# Patient Record
Sex: MALE | Race: WHITE | Employment: UNEMPLOYED | URBAN - METROPOLITAN AREA
[De-identification: names, ages, dates, MRNs, and addresses within clinical notes are randomized per-mention and may not be internally consistent; named-entity substitution may affect disease eponyms.]

---

## 2018-07-20 ENCOUNTER — HOSPITAL ENCOUNTER (OUTPATIENT)
Dept: LAB | Age: 23
Discharge: HOME | End: 2018-07-20
Payer: COMMERCIAL

## 2018-07-20 ENCOUNTER — OFFICE VISIT (OUTPATIENT)
Dept: SURGERY | Age: 23
End: 2018-07-20

## 2018-07-20 VITALS
TEMPERATURE: 98.2 F | RESPIRATION RATE: 20 BRPM | BODY MASS INDEX: 44.1 KG/M2 | HEIGHT: 71 IN | SYSTOLIC BLOOD PRESSURE: 140 MMHG | WEIGHT: 315 LBS | DIASTOLIC BLOOD PRESSURE: 70 MMHG | HEART RATE: 84 BPM

## 2018-07-20 DIAGNOSIS — M12.9 ARTHROPATHY: ICD-10-CM

## 2018-07-20 DIAGNOSIS — E66.01 MORBID OBESITY (HCC): Primary | ICD-10-CM

## 2018-07-20 DIAGNOSIS — E66.01 MORBID OBESITY (HCC): ICD-10-CM

## 2018-07-20 LAB
25(OH)D3 SERPL-MCNC: 16.4 NG/ML (ref 30–100)
ALBUMIN SERPL-MCNC: 3.9 G/DL (ref 3.4–5)
ANION GAP SERPL CALC-SCNC: 7 MMOL/L (ref 3–18)
ATRIAL RATE: 83 BPM
BASOPHILS # BLD: 0 K/UL (ref 0–0.1)
BASOPHILS NFR BLD: 0 % (ref 0–2)
BUN SERPL-MCNC: 17 MG/DL (ref 7–18)
BUN/CREAT SERPL: 20 (ref 12–20)
CALCIUM SERPL-MCNC: 8.8 MG/DL (ref 8.5–10.1)
CALCULATED P AXIS, ECG09: 50 DEGREES
CALCULATED R AXIS, ECG10: 47 DEGREES
CALCULATED T AXIS, ECG11: 49 DEGREES
CHLORIDE SERPL-SCNC: 104 MMOL/L (ref 100–108)
CO2 SERPL-SCNC: 30 MMOL/L (ref 21–32)
CREAT SERPL-MCNC: 0.83 MG/DL (ref 0.6–1.3)
DIAGNOSIS, 93000: NORMAL
DIFFERENTIAL METHOD BLD: ABNORMAL
EOSINOPHIL # BLD: 0.2 K/UL (ref 0–0.4)
EOSINOPHIL NFR BLD: 2 % (ref 0–5)
ERYTHROCYTE [DISTWIDTH] IN BLOOD BY AUTOMATED COUNT: 13.2 % (ref 11.6–14.5)
FOLATE SERPL-MCNC: 10.5 NG/ML (ref 3.1–17.5)
GLUCOSE SERPL-MCNC: 100 MG/DL (ref 74–99)
HCT VFR BLD AUTO: 43 % (ref 36–48)
HGB BLD-MCNC: 14.3 G/DL (ref 13–16)
IRON SERPL-MCNC: 48 UG/DL (ref 50–175)
LYMPHOCYTES # BLD: 3.6 K/UL (ref 0.9–3.6)
LYMPHOCYTES NFR BLD: 26 % (ref 21–52)
MCH RBC QN AUTO: 27.2 PG (ref 24–34)
MCHC RBC AUTO-ENTMCNC: 33.3 G/DL (ref 31–37)
MCV RBC AUTO: 81.9 FL (ref 74–97)
MONOCYTES # BLD: 1 K/UL (ref 0.05–1.2)
MONOCYTES NFR BLD: 7 % (ref 3–10)
NEUTS SEG # BLD: 8.8 K/UL (ref 1.8–8)
NEUTS SEG NFR BLD: 65 % (ref 40–73)
P-R INTERVAL, ECG05: 144 MS
PLATELET # BLD AUTO: 423 K/UL (ref 135–420)
PMV BLD AUTO: 9.7 FL (ref 9.2–11.8)
POTASSIUM SERPL-SCNC: 4.9 MMOL/L (ref 3.5–5.5)
Q-T INTERVAL, ECG07: 368 MS
QRS DURATION, ECG06: 100 MS
QTC CALCULATION (BEZET), ECG08: 432 MS
RBC # BLD AUTO: 5.25 M/UL (ref 4.7–5.5)
SODIUM SERPL-SCNC: 141 MMOL/L (ref 136–145)
VENTRICULAR RATE, ECG03: 83 BPM
VIT B12 SERPL-MCNC: 499 PG/ML (ref 211–911)
WBC # BLD AUTO: 13.6 K/UL (ref 4.6–13.2)

## 2018-07-20 PROCEDURE — 82040 ASSAY OF SERUM ALBUMIN: CPT | Performed by: SURGERY

## 2018-07-20 PROCEDURE — 84425 ASSAY OF VITAMIN B-1: CPT | Performed by: SURGERY

## 2018-07-20 PROCEDURE — 85025 COMPLETE CBC W/AUTO DIFF WBC: CPT | Performed by: SURGERY

## 2018-07-20 PROCEDURE — 80048 BASIC METABOLIC PNL TOTAL CA: CPT | Performed by: SURGERY

## 2018-07-20 PROCEDURE — 93005 ELECTROCARDIOGRAM TRACING: CPT

## 2018-07-20 PROCEDURE — 82306 VITAMIN D 25 HYDROXY: CPT | Performed by: SURGERY

## 2018-07-20 PROCEDURE — 82607 VITAMIN B-12: CPT | Performed by: SURGERY

## 2018-07-20 PROCEDURE — 83540 ASSAY OF IRON: CPT | Performed by: SURGERY

## 2018-07-20 PROCEDURE — 36415 COLL VENOUS BLD VENIPUNCTURE: CPT | Performed by: SURGERY

## 2018-07-20 RX ORDER — NAPROXEN SODIUM 220 MG
220 TABLET ORAL 2 TIMES DAILY WITH MEALS
COMMUNITY
End: 2019-01-04 | Stop reason: CLARIF

## 2018-07-20 NOTE — PROGRESS NOTES
Initial Consultation for Bariatric Surgery Template (Gastric Bypass)    Darrick Guerra is a 25 y.o. male who comes into the office today for initial consultation for the surgical options for the treatment of morbid obesity. The patient initially identified obesity since early childhood and at age 26 weighed 400 lbs. He has tried a variety of unsupervised weight-loss attempts including self imposed and Weight Watchers, but has yet to meet with lasting success. Maximum weight lost on a diet is about 20 lbs, but that the weight loss always seems to return. Today, the patient is  Height: 5' 11\" (180.3 cm) tall, Weight: (!) 195.5 kg (431 lb) lbs for a Body mass index is 60.11 kg/(m^2). It is due to the patient's severe obesity, which is further complicated by weight related arthopathies  that the patient is now seeking out bariatric surgery, specifically, gastric bypass. Past Medical History:   Diagnosis Date    Morbid obesity ([address]        History reviewed. No pertinent surgical history. Current Outpatient Prescriptions   Medication Sig Dispense Refill    naproxen sodium (ALEVE) 220 mg tablet Take 220 mg by mouth two (2) times daily (with meals).          Allergies   Allergen Reactions    Pcn [Penicillins] Hives       Social History   Substance Use Topics    Smoking status: Never Smoker    Smokeless tobacco: Never Used    Alcohol use Yes      Comment: occasional       Family History   Problem Relation Age of Onset    Hypertension Mother     Hypertension Father        Family Status   Relation Status    Mother [de-identified]    Father Alive       Review of Systems:  Positive in BOLD    CONST: Fever, weight loss, fatigue or chills  GI: Nausea, vomiting, abdominal pain, change in bowel habits, hematochezia, melena, and GERD   INTEG: Dermatitis, abnormal moles  HEENT: Recent changes in vision, vertigo, epistaxis, dysphagia and hoarseness  CV: Chest pain, palpitations, HTN, edema and varicosities  RESP: Cough, shortness of breath, wheezing, hemoptysis, snoring and reactive airway disease  : Hematuria, dysuria, frequency, urgency, nocturia and stress urinary incontinence   MS: Weakness, joint pain and arthritis  ENDO: Diabetes, thyroid disease, polyuria, polydipsia, polyphagia, poor wound healing, heat intolerance, cold intolerance  LYMPH/HEME: Anemia, bruising and history of blood transfusions  NEURO: Dizziness, headache, fainting, seizures and stroke  PSYCH: Anxiety and depression      Physical Exam    Visit Vitals    /70    Pulse 84    Temp 98.2 °F (36.8 °C)    Resp 20    Ht 5' 11\" (1.803 m)    Wt (!) 195.5 kg (431 lb)    BMI 60.11 kg/m2       Pre op weight: 431  EBW: 271  Wt loss to date: 0       General: 25 y.o.) male in no acute distress. Morbidly obese in abdomen, chest and hips - gynecoid pattern  HEENT: Normocephalic, atraumatic, Pupils equal and reactive, nasopharynx clear, oropharynx clear and moist without lesions  NECK: Supple, no lymphadenopathy, thyromegaly, carotid bruits or jugular venous distension. trachea midline  RESP: Clear to auscultation bilaterally, no wheezes, rhonchi, or rales, normal respiratory excursion  CV: Regular rate and rhythm, no murmurs, rubs or gallops. 3+/4 pulses in bilateral dorsalis pedis and posterior tibialis. No distal edema or varicosities. ABD: Soft, nontender, nondistended, normoactive bowel sounds, no hernias, no hepatosplenomegaly, easily palpable costal margins, gynecoid distribution  Extremities: Warm, well perfused, no tenderness or swelling, normal gait/station  Neuro: Sensation and strength grossly intact and symmetrical  Psych: Alert and oriented to person, place, and time. Impression:    Kirill Moyer is a 25 y.o. male who is suffering from morbid obesity with a BMI of 60  and comorbidities including weight related arthopathies  who would benefit from bariatric surgery.   We have had an extensive discussion with regard to the risks, benefits and likely outcomes of the operation. We've discussed the restrictive and malabsorptive nature of the gastric bypass and compared and contrasted with the sleeve gastrectomy. The patient understands the likelihood of losing approximately 80% of their excess weight in 12 to 18 months. He also understands the risks including but not limited to bleeding, infection, need for reoperation, ulcers, leaks and strictures, bowel obstruction secondary to adhesions and internal hernias, DVT, PE, heart attack, stroke, and death. Patient also understands risks of inadequate weight loss, excess weight loss, vitamin insufficiency, protein malnutrition, excess skin, and loss of hair. We have reviewed the components of a successful postoperative course including requirement for a high protein, low carbohydrate diet, 60 oz a day of zero calorie liquids, daily vitamin supplementation, daily exercise, regular follow-up, and participation in support groups. At this time we will enroll the patient in our bariatric program, undertake routine laboratory evaluation, chest X-ray, EKG, possible UGI and evaluation by  nutritionist as well as psychologist and pending their satisfactory completion of the preop evaluation, plan to perform a gastric bypass if he successfully loses 10% of his weight or a 2 stage if unsuccessful. Rusty Yousif

## 2018-07-20 NOTE — PROGRESS NOTES
Pt ID confirmed    Weight Loss Metrics 7/20/2018 7/20/2018   Pre op / Initial Wt 431 -   Today's Wt - 431 lb   BMI - 60.11 kg/m2   Ideal Body Wt 160 -   Excess Body Wt 271 -   Goal Wt 214 -   Wt loss to date 0 -   % Wt Loss 0 -   80% .8 -       Body mass index is 60.11 kg/(m^2).

## 2018-07-23 LAB — UREA BREATH TEST QL: NEGATIVE

## 2018-07-24 NOTE — PROGRESS NOTES
7/24/18:  Spoke to patient regarding his blood work. Patient was instructed to start on 5000 IU. Normal: . Patient's level was 16.1. I also addressed his iron levels, as well. Normal: . Patient's levels were 49. Patient to start on 65 mg of iron per day. Patient may contact me with any questions.     Unique Crowder MS RD

## 2018-07-25 LAB — VIT B1 BLD-SCNC: 128.7 NMOL/L (ref 66.5–200)

## 2018-08-07 ENCOUNTER — DOCUMENTATION ONLY (OUTPATIENT)
Dept: BARIATRICS/WEIGHT MGMT | Age: 23
End: 2018-08-07

## 2018-08-07 ENCOUNTER — HOSPITAL ENCOUNTER (OUTPATIENT)
Dept: BARIATRICS/WEIGHT MGMT | Age: 23
Discharge: HOME | End: 2018-08-07

## 2018-08-07 NOTE — PROGRESS NOTES
[facility] [address] [facility], [address]    Patient's Name: Raymundo Farrell   Age: 26 y.o. YOB: 1995   Sex: male    Date:   8/7/2018    Insurance:            Session: 1 of  3  Revision:   Surgeon:  Dr. Kyung Payan    Height: 5 f 11 Weight:    426      Lbs. BMI: 59.4   Pounds Lost since last month: 5               Pounds Gained since last month: 0    Starting Weight: 431   Previous Months Weight: 431  Overall Pounds Lost: 5 Overall Pounds Gained: 0      Do you smoke? None    Alcohol intake:  Number of drinks at a time:  6 pack  Number of times a week: 2 x a month    Class Guidelines    Guidelines are reviewed with patient at the start of every class. 1. Patient understands that weight loss trial classes must be consecutive. Patient understands if they miss a class, it is their responsibility to contact me to reschedule class. I will reach out to patient after their first no show. 2.  Patient understands the expectations that weight maintenance/weight loss is expected during the classes. Failure to demonstrate changes may result in one extra month of weight loss trial, followed by going back to see the surgeon. Patient understands that they CAN NOT gain any weight during the weight loss trial.  Gaining weight will result in extra classes. 3. Patient is also instructed to be doing their labs, blood work, psych visit, support group and any other test that the surgeon has used while they are working on their weight loss trial.  4.  Patient was instructed to bring their blue binder to every class and appointment. Other Pertinent Information: 40 pounds per Dr. Bud Shearer Since Last Class: Getting a new job that is better for his ankle. Eating Habits and Behaviors    Today in class, we reviewed the key diet principles. I have talked to patient about pushing the fluid and working towards 64 ounces per day.   Patient was encouraged to stop all liquid calories. We talked about protein-based snacks and cutting out carbohydrates. Patient was encouraged to follow a meat and vegetable diet with small amounts of fruit, trying to focus more on berries. Patient was shown a portion size plate and we talked about how patient could save 200 calories by using a small plate. Patient's current diet habits include: 3 meals per day. Patient is eating 2 amaro and egg cheese biscuits. I have talked to him about trying to leave the biscuit part alone. He is eating fast food for lunch, so we talked about healthier options. Dinner is a meat, starch, and vegetable. He is snacking on chips. We talked about more protein-based snacks. He is drinking 20 ounces of water and 12 ounces of soda, which we talked about stopping. Physical Activity/Exercise    Comments: We talked about exercise. Patient was given reasons of why exercise is so important and how that can help with their long-term success. I have encouraged patient to get a support system to help with the activity. Currently for activity, patient is not doing anything stating he works from 630 am- 6;30 pm.      Behavior Modification       Comments:  During today's lesson, I also focused on alternatives to managing stress. Patient was given tips to managing stress, but also encouraged patient to start looking for other outlets to manage stress. Patient was encouraged to identify events or situations that are a source of stress. These could include work, finances, health, weight, personal events, or daily hassles. Patient was then encouraged to list ways that they respond to stress, including physical and emotional reactions and any changes in behavior. Patient was then asked to identify strategies to help reduce or manage stress in their life. Goals that patient wants to work on includes:  1. Drop 40 pounds by October  2. Cut out sweetened drinks  3.  Limit fast food intake      Carolina Pagan [address] RD  8/7/2018

## 2018-09-04 ENCOUNTER — HOSPITAL ENCOUNTER (OUTPATIENT)
Dept: BARIATRICS/WEIGHT MGMT | Age: 23
Discharge: HOME | End: 2018-09-04

## 2018-09-05 ENCOUNTER — DOCUMENTATION ONLY (OUTPATIENT)
Dept: BARIATRICS/WEIGHT MGMT | Age: 23
End: 2018-09-05

## 2018-09-05 NOTE — PROGRESS NOTES
[address], [address]    Patient's Name: Naveed Hernadez   Age: 26 y.o. YOB: 1995   Sex: male    Date:   9/5/2018    Insurance:            Session: 2 of  3  Revision:   Surgeon:  Dr. Na Michael    Height: 5 f 11 Weight:    424      Lbs. BMI: 59.3   Pounds Lost since last month: 2               Pounds Gained since last month: 0    Starting Weight: 431   Previous Months Weight: 426  Overall Pounds Lost: 7 Overall Pounds Gained: 0      Do you smoke? None    Alcohol intake:  Number of drinks at a time:  4  Number of times a week: 1 x a month    Class Guidelines    Guidelines are reviewed with patient at the start of every class. 1. Patient understands that weight loss trial classes must be consecutive. Patient understands if they miss a class, it is their responsibility to contact me to reschedule class. I will reach out to patient after their first no show. 2.  Patient understands the expectations that weight maintenance/weight loss is expected during the classes. Failure to demonstrate changes may result in one extra month of weight loss trial, followed by going back to see the surgeon. 3. Patient is also instructed to be doing their labs, blood work, psych visit, support group and any other test that the surgeon has used while they are working on their weight loss trial.    Other Pertinent Information:     Changes Made Since Last Class: Patient states he has continued to try to cut out sweetened beverages. Eating Habits and Behaviors      Today we reviewed key diet principles. We talked about ways that patient can follow a low calorie diet. These included: Stopping all liquid calories and patient was given a list of fluid choices that would be appropriate. We talked about carbohydrates.   Patient was educated that all carbohydrates turn to sugar and was encouraged to stick to the complex carbohydrates while in weight loss trials, but aim to keep less than 100 grams during weight loss trials. Patient was given a list of foods to not eat and drink due to high sugar, high fat, or high carbohydrate content. Patient was also given a list of foods and drinks that are high protein, low carbohydrate, and would be appropriate to eat. Patient was given a list of fruit and what a portion size is and how many carbohydrates it contains. Patient was encouraged to keep fruit intake to a minimum. Patient was also given a list of 50 low carbohydrate snack options, but was also cautioned that some of the choices still were high in calories and portion control should be practiced. Patient's current diet habits include: Patient is eating 2 meals per day. He is often skipping breakfast.  I have suggested doing a shake in the morning. He is doing leftovers from dinner for lunch, but did not specify what these leftovers are. He is doing a meat, starch, and vegetable for dinner. Patient states he does not snack in between meals. He is eating out 4-6 x a week, which I have addressed with him. He is drinking 64 ounces of water per day and 12 ounces of diet soda. Physical Activity/Exercise    Comments:     Currently for exercise, patient is not doing anything. He states his feet hurt from being on them all day at work. We talked about activities for patient to do, including walking, swimming, or chair exercises. Behavior Modification       Comments:   Patient was encouraged to food journal. I talked with patient about tracking their daily carbohydrate. One helpful timothy is My Fitness Pal.  Patient was also encouraged to track their daily fluid intake. Discussed with patient the timothy, Water Logged, that can be helpful in tracking their fluid intake. We also talked about the importance of planning ahead. Lack of willpower is often a lack of planning. Assessment:    Patient has lost 7 pounds.   Patient will need to stop all liquid calories, including alcohol, to achieve the weight loss goal that Dr. Liz Bowen has set for him.     Carolina Giraldo [address]ly [address] RD  9/5/2018

## 2018-10-02 ENCOUNTER — HOSPITAL ENCOUNTER (OUTPATIENT)
Dept: BARIATRICS/WEIGHT MGMT | Age: 23
Discharge: HOME | End: 2018-10-02

## 2018-10-03 ENCOUNTER — DOCUMENTATION ONLY (OUTPATIENT)
Dept: BARIATRICS/WEIGHT MGMT | Age: 23
End: 2018-10-03

## 2018-11-06 ENCOUNTER — OFFICE VISIT (OUTPATIENT)
Dept: SURGERY | Age: 23
End: 2018-11-06

## 2018-11-06 ENCOUNTER — HOSPITAL ENCOUNTER (OUTPATIENT)
Dept: BARIATRICS/WEIGHT MGMT | Age: 23
Discharge: HOME | End: 2018-11-06

## 2018-11-06 VITALS
HEART RATE: 60 BPM | RESPIRATION RATE: 18 BRPM | HEIGHT: 71 IN | TEMPERATURE: 98.1 F | BODY MASS INDEX: 44.1 KG/M2 | WEIGHT: 315 LBS

## 2018-11-06 DIAGNOSIS — M12.9 ARTHROPATHY: ICD-10-CM

## 2018-11-06 DIAGNOSIS — R79.89 LOW VITAMIN D LEVEL: ICD-10-CM

## 2018-11-06 DIAGNOSIS — E61.1 IRON DEFICIENCY: ICD-10-CM

## 2018-11-06 DIAGNOSIS — E66.01 MORBID OBESITY (HCC): Primary | ICD-10-CM

## 2018-11-06 RX ORDER — MELATONIN
5000 DAILY
COMMUNITY

## 2018-11-06 NOTE — PROGRESS NOTES
Bariatric Preoperative Progress note:    Subjective:     Major Schuster is a 26 y.o. male who presents today for followup of their candidacy for bariatric surgery. Since last seen, Major Schuster has been working through bariatric program towards LGBP . He is down 27lbs over the past three months and would like to continue toward his goal of approx 40lbs weight loss for a 1 stage LGBP. Past Medical History:   Diagnosis Date    Morbid obesity ([address]        No past surgical history on file. Current Outpatient Medications   Medication Sig Dispense Refill    cholecalciferol (VITAMIN D3) 1,000 unit tablet Take 5,000 Units by mouth daily.  naproxen sodium (ALEVE) 220 mg tablet Take 220 mg by mouth two (2) times daily (with meals). Allergies   Allergen Reactions    Pcn [Penicillins] Hives       ROS:  ROS      Objective:     Physical Exam:  Visit Vitals  Pulse 60   Temp 98.1 °F (36.7 °C)   Resp 18   Ht 5' 11\" (1.803 m)   Wt (!) 183.3 kg (404 lb)   BMI 56.35 kg/m²       Physical Exam    Studies to date:    Labs: Results for Hien Sanchez (MRN [id number]) as of 11/6/2018 16:04   Ref. Range 7/20/2018 15:40   WBC Latest Ref Range: 4.6 - 13.2 K/uL 13.6 (H)   RBC Latest Ref Range: 4.70 - 5.50 M/uL 5.25   HGB Latest Ref Range: 13.0 - 16.0 g/dL 14.3   HCT Latest Ref Range: 36.0 - 48.0 % 43.0   MCV Latest Ref Range: 74.0 - 97.0 FL 81.9   MCH Latest Ref Range: 24.0 - 34.0 PG 27.2   MCHC Latest Ref Range: 31.0 - 37.0 g/dL 33.3   RDW Latest Ref Range: 11.6 - 14.5 % 13.2   PLATELET Latest Ref Range: 135 - 420 K/uL 423 (H)   MPV Latest Ref Range: 9.2 - 11.8 FL 9.7   NEUTROPHILS Latest Ref Range: 40 - 73 % 65   LYMPHOCYTES Latest Ref Range: 21 - 52 % 26   MONOCYTES Latest Ref Range: 3 - 10 % 7   EOSINOPHILS Latest Ref Range: 0 - 5 % 2   BASOPHILS Latest Ref Range: 0 - 2 % 0   DF Latest Units:   AUTOMATED   ABS. NEUTROPHILS Latest Ref Range: 1.8 - 8.0 K/UL 8.8 (H)   ABS. LYMPHOCYTES Latest Ref Range: 0.9 - 3.6 K/UL 3.6   ABS. MONOCYTES Latest Ref Range: 0.05 - 1.2 K/UL 1.0   ABS. EOSINOPHILS Latest Ref Range: 0.0 - 0.4 K/UL 0.2   ABS. BASOPHILS Latest Ref Range: 0.0 - 0.1 K/UL 0.0   Sodium Latest Ref Range: 136 - 145 mmol/L 141   Potassium Latest Ref Range: 3.5 - 5.5 mmol/L 4.9   Chloride Latest Ref Range: 100 - 108 mmol/L 104   CO2 Latest Ref Range: 21 - 32 mmol/L 30   Anion gap Latest Ref Range: 3.0 - 18 mmol/L 7   Glucose Latest Ref Range: 74 - 99 mg/dL 100 (H)   BUN Latest Ref Range: 7.0 - 18 MG/DL 17   Creatinine Latest Ref Range: 0.6 - 1.3 MG/DL 0.83   BUN/Creatinine ratio Latest Ref Range: 12 - 20   20   Calcium Latest Ref Range: 8.5 - 10.1 MG/DL 8.8   GFR est non-AA Latest Ref Range: >60 ml/min/1.73m2 >60   GFR est AA Latest Ref Range: >60 ml/min/1.73m2 >60   Albumin Latest Ref Range: 3.4 - 5.0 g/dL 3.9   Vitamin B12 Latest Ref Range: 211 - 911 pg/mL 499   Folate Latest Ref Range: 3.10 - 17.50 ng/mL 10.5   Iron Latest Ref Range: 50 - 175 ug/dL 48 (L)   VITAMIN B1, WHOLE BLOOD Unknown Rpt   Vitamin D 25-Hydroxy Latest Ref Range: 30 - 100 ng/mL 16.4 (L)       EKG: Normal sinus rhythm with sinus arrhythmia   Normal ECG   No previous ECGs available   Confirmed by Vernadine Batch ([id number] ) on 7/20/2018 5:19:58 PM  Notes Recorded by Jermaine Ko MD on 7/21/2018 at 12:54 PM  ok    Nutritional evaluation: 3/3, recommenced 1-2 more WLT     Psychiatric evaluation:approved     Support group: pending    PCP clearance: pending       Assessment:   Arthor Cooks is a 26 y.o. male who is progressing through the bariatric preoperative evaluation. At this time, they are an appropriate candidate for weight loss surgery. Plan:   -complete remainder of preop evaluation including continue with RD support monthly, support group, PCP clearance, and remaining wt loss to have 1 stage procedure.   -pt communicates understanding that the expectation is to lose or weight during WLT, specifically approx 40lbs. Weight gain may result in delaying surgery.    - continue with vit d3 5000 units daily and add iron recommend BA   -Follow up once has completed entirety of weight loss workup to determine next steps.          >50% of 30 min visit spent counseling     JUANJO Mejía-BC

## 2018-11-07 ENCOUNTER — DOCUMENTATION ONLY (OUTPATIENT)
Dept: BARIATRICS/WEIGHT MGMT | Age: 23
End: 2018-11-07

## 2018-11-07 NOTE — PROGRESS NOTES
[address]    Patient's Name: Arnel Betancur   Age: 26 y.o. YOB: 1995   Sex: male    Date:   11/7/2018    Insurance:            Session: 4 of  3  Revision:   Surgeon:  Dr. Margo Kay    Height: 5 f 11 Weight:    404      Lbs. BMI: 56.5   Pounds Lost since last month: 8               Pounds Gained since last month: 0    Starting Weight: 431   Previous Months Weight: 412  Overall Pounds Lost: 27 Overall Pounds Gained: 0      Do you smoke? None    Alcohol intake:  Number of drinks at a time:  1  Number of times a week: 1    Class Guidelines    Guidelines are reviewed with patient at the start of every class. 1. Patient understands that weight loss trial classes must be consecutive. Patient understands if they miss a class, it is their responsibility to contact me to reschedule class. I will reach out to patient after their first no show. 2.  Patient understands the expectations that weight maintenance/weight loss is expected during the classes. Failure to demonstrate changes may result in one extra month of weight loss trial, followed by going back to see the surgeon. 3. Patient is also instructed to be doing their labs, blood work, psych visit, support group and any other test that the surgeon has used while they are working on their weight loss trial.    Other Pertinent Information: 10% body weight lost per Dr. Margo Kay (43 pounds)    Changes Made Since Last Class: More weight loss      Dietary Instruction    During today's class we continued to focus on the key diet principles. Patient was instructed to follow a low carbohydrate diet, focusing on meat and vegetables. Patient was instructed to stop liquid calories and aim for 64 ounces of water per day. In class, I also gave patient a power point on surviving the holidays.   Some of the tips included survival tips for parties, including bringing their own low carbohydrate dish to a potluck dinner and surveying the buffet line before they start filling up their plate. Patient was given cooking alternatives, including using Splenda for sugar, substituting applesauce for oil in recipes, and using low fat plain yogurt instead of sour cream in dips. Patient was also encouraged to be mindful of calories in alcohol. Patient's diet habits include: 3 meals per day. Patient states he is doing slim fast for breakfast.  Lunch is meat and vegetables. Dinner is meat and vegetables, but states he is having a small portion of carbohydrates here. He states if he snacks, he is snacking on pistachios. He is drinking water, Crystal Light, and some diet soda. Physical Activity/Exercise    Patient is currently doing some walking and some light weights for activity. Today's power point on surviving the holidays also included tips on exercising. This included being creative during the holiday, walking stairs, mall walking, getting resistance bands. Patient was encouraged not to be afraid to excuse themselves from the table to go for a walk after they eat. Comments:     Behavior Modification    Reinforced behavior changes to make. Patient was encouraged to keep their emotions in check. Try to HALT and focus on whether they are eating out of hunger or if they are eating out of emotions. Other eating behaviors included surveying the buffet line before starting to fill up their plate. Patient was given a check off list and encouraged to monitor some of their eating behaviors, such as eating slowly, chewing their food thoroughly, and taking 20-30 minutes to eat a meal.    Goals that patient set for next month include:  Continue towards goal of 40 pounds  Exercise  Continue eating better.       [facility] [address]  11/7/2018

## 2018-12-04 ENCOUNTER — HOSPITAL ENCOUNTER (OUTPATIENT)
Dept: BARIATRICS/WEIGHT MGMT | Age: 23
Discharge: HOME | End: 2018-12-04

## 2018-12-05 ENCOUNTER — DOCUMENTATION ONLY (OUTPATIENT)
Dept: BARIATRICS/WEIGHT MGMT | Age: 23
End: 2018-12-05

## 2018-12-05 NOTE — PROGRESS NOTES
[address] [place] Adria Fuchs [address] [facility], [address]    Patient's Name: Haley Avendano   Age: 26 y.o. YOB: 1995   Sex: male    Date:   12/5/2018    Insurance:            Session: 5 of  3  Revision:   Surgeon:  Dr. Silvino Naylor    Height: 5 f 11 Weight:    390      Lbs. BMI: 54.5   Pounds Lost since last month: 14               Pounds Gained since last month: 0      Starting Weight: 431   Previous Months Weight: 404  Overall Pounds Lost: 41 Overall Pounds Gained: 0      Do you smoke? None    Alcohol intake:  Number of drinks at a time:  1  Number of times a week: 1 drink a month or less. Class Guidelines    Guidelines are reviewed with patient at the start of every class. 1. Patient understands that weight loss trial classes must be consecutive. Patient understands if they miss a class, it is their responsibility to contact me to reschedule class. I will reach out to patient after their first no show. 2.  Patient understands the expectations that weight maintenance/weight loss is expected during the classes. Failure to demonstrate changes may result in one extra month of weight loss trial, followed by going back to see the surgeon. Patient understands that they CAN NOT gain any weight during the weight loss trial.  Gaining weight will result in extra classes. 3. Patient is also instructed to be doing their labs, blood work, psych visit, support group and any other test that the surgeon has used while they are working on their weight loss trial.  4.  Patient was instructed to bring their blue binder to every class and appointment. Other Pertinent Information:     Changes Made Since Last Class: Got a membership to [employer]. Eating Habits and Behaviors    Today in class, we reviewed the key diet principles. Specifically, patient was instructed to watch their carbohydrate intake.   We talked about foods that have carbohydrates in them and alternatives in place of this. Patient was encouraged to start cutting out bread, rice, pasta, and other starches. Patient is encouraged to work towards 64 ounces of fluid per day, avoiding soda, sweet tea, and fruit juices. I also gave a presentation at today's class on Eat Out Options. We looked at fast food traps and dining out strategies to stay in control. Patient was also given ideas from various restaurants that would be a healthier option. Patient's current diet habits include: 2-3 meals per day. States he is eating an Eggwich sandwich for breakfast without the bread. Lunch is a meat roll up. Dinner is meat and 2 vegetables. He is snacking on a cheese stick or pistachios. He is drinking 116 ounces of water and Crystal Light. Physical Activity/Exercise    Comments: We talked about exercise. Patient was given reasons of why exercise is so important and how that can help with their long-term success. I have encouraged patient to get a support system to help with the activity. Currently for activity, patient is doing some cardio. He recently joined [employer]. Behavior Modification       Comments:  I have also talked to patient about some behavior changes including taking 20-30 minutes to eat a meal.  Patient is encouraged to get a timer and use smaller utensils to help them stretch their meal out. Patient is also encouraged to get into a routine of not eating after 7 pm and make the TV a no eating zone. Goals that patient wants to work on includes:  1. Continue with low carbohydrates.    [address], MS RD  12/5/2018

## 2018-12-05 NOTE — PROGRESS NOTES
Nutrition Evaluation    Patient's Name: Radha Rhodes   Age: 26 y.o. YOB: 1995   Sex: male    Height: 5 f 11 Weight: 390 BMI:  54.5  Starting Weight:  431      Patient has completed a 5 month weight loss trial.  During the classes, we have focused on 3 components including diet, exercise, and behavior modifications. Upon completion of the weight loss trial, patient had a good understanding of the 3 components. The diet component of the weight loss trial emphasized on:   Label reading and keeping total fat and sugar less than 3 grams per serving    Proper portion sizes    Drinking 64 ounces of water per day   Cutting out simple sugar   During class, we focused on a low calorie diet and focused on keeping the carbohydrates less than 100 grams per day during the pre-op phase. The exercise component of the weight loss trial emphasized on:   The importance of getting into an exercise routine.  Ideas and goals for exercise were discussed with the patient. The behavior component of the weight loss trial emphasized on:   Taking 20-30 minutes to eat a meal.   Planning ahead to avoid making poor dietary choices.  Not eating in front of the TV or computer    Smoking Status:  None  Alcohol Intake:  Number of Drinks at a Time: 1  Number of Times a Month: 1    Changes made during classes include:  No soda or sweet tea  No starches  Limiting carbohydrates  One plate        Two things that patient learned during this weight loss trial:  I can live without carbohydrates. Sugar free drinks are okay    Summary:  I feel that Radha Rhodes has demonstrated appropriate diet changes and is ready to move forward with surgery. Patient has been briefed on the importance of the protein drinks, vitamins, and the transition of the diet stages. Patient understands that the long-term diet will focus on protein and vegetables.   Patient understand the effects of carbohydrates after surgery and what reactive hypoglycemia is. Patient is aware that they will be attending pre-op class 2 weeks before surgery and will get more detailed information on the post-op diet guidelines. Patient will see me again at 6 weeks post-op. At this 6 week visit, RD will assess how patient is tolerating soft protein and advance to vegetables, if tolerating soft protein without difficulty. Patient will also see RD again at 9 months post-op. This visit will assess patient's compliance with current protocol, including diet, vitamins, protein shakes, and exercise. Post-op diet guidelines will be reinforced. RD is available for questions and to meet with patient outside of the 6 week and 9 month post-op visit. Patient has attended at least one support group.     Candidate for surgery: Yes  Re-evaluation Date:     Procedure:  Gastric Bypass     Gume Garcia MS RD  12/5/2018

## 2018-12-07 ENCOUNTER — TELEPHONE (OUTPATIENT)
Dept: SURGERY | Age: 23
End: 2018-12-07

## 2018-12-19 ENCOUNTER — TELEPHONE (OUTPATIENT)
Dept: SURGERY | Age: 23
End: 2018-12-19

## 2018-12-20 ENCOUNTER — OFFICE VISIT (OUTPATIENT)
Dept: SURGERY | Age: 23
End: 2018-12-20

## 2018-12-20 VITALS
OXYGEN SATURATION: 98 % | DIASTOLIC BLOOD PRESSURE: 72 MMHG | SYSTOLIC BLOOD PRESSURE: 124 MMHG | TEMPERATURE: 98.6 F | RESPIRATION RATE: 18 BRPM | HEIGHT: 71 IN | WEIGHT: 315 LBS | HEART RATE: 72 BPM | BODY MASS INDEX: 44.1 KG/M2

## 2018-12-20 DIAGNOSIS — E66.01 MORBID OBESITY (HCC): Primary | ICD-10-CM

## 2018-12-20 DIAGNOSIS — R79.89 LOW VITAMIN D LEVEL: ICD-10-CM

## 2018-12-20 NOTE — PROGRESS NOTES
Chief Complaint   Patient presents with    Pre-op Exam     bariatric program pt presents today to discuss surgical options. Pt ID confirmed    Weight Loss Metrics 12/20/2018 11/6/2018 11/6/2018 7/20/2018 7/20/2018   Pre op / Initial Wt 391.8 431 - 431 -   Today's Wt 391 lb 12.8 oz - 404 lb - 431 lb   BMI 54.65 kg/m2 - 56.35 kg/m2 - 60.11 kg/m2   Ideal Body Wt 160 160 - 160 -   Excess Body Wt 231.8 271 - 271 -   Goal Wt 206 214 - 214 -   Wt loss to date 0 27 - 0 -   % Wt Loss 0 0.12 - 0 -   80% .44 216.8 - 216.8 -       Body mass index is 54.65 kg/m².

## 2018-12-20 NOTE — PROGRESS NOTES
Preop History and Physical Exam:    Ailin Villatoro is a 26 y.o. male who comes into the office today after completing the entirety of the bariatric preoperative protocol satisfactorily. he initially identified obesity at early childhood and at age 26 weighed 400lbs. he has tried a variety of unsupervised weight-loss attempts, but has yet to meet with lasting success. Today, the patient is Height: 5' 11\" (180.3 cm) tall, Weight: (!) 177.7 kg (391 lb 12.8 oz) lbs for a Body mass index is 54.65 kg/m². It is due to their severe obesity, which is further complicated by weight related arthopathies  that the patient is now seeking out bariatric surgery, specifically, the gastric bypass      Past Medical History:   Diagnosis Date    Morbid obesity ([facility] [address]        No past surgical history on file. Current Outpatient Medications   Medication Sig Dispense Refill    cholecalciferol (VITAMIN D3) 1,000 unit tablet Take 5,000 Units by mouth daily.  naproxen sodium (ALEVE) 220 mg tablet Take 220 mg by mouth two (2) times daily (with meals).          Allergies   Allergen Reactions    Pcn [Penicillins] Hives       Social History     Tobacco Use    Smoking status: Never Smoker    Smokeless tobacco: Never Used   Substance Use Topics    Alcohol use: Yes     Frequency: Monthly or less     Comment: occasional    Drug use: No       Family History   Problem Relation Age of Onset    Hypertension Mother     Hypertension Father        Review of Systems:  Positive in BOLD    CONST: Fever, weight loss, fatigue - improved or chills  GI: Nausea, vomiting, abdominal pain, change in bowel habits, hematochezia, melena, and GERD   INTEG: Dermatitis, abnormal moles  HEENT: Recent changes in vision, vertigo, epistaxis, dysphagia and hoarseness  CV: Chest pain, palpitations, HTN, edema and varicosities  RESP: Cough, shortness of breath, wheezing, hemoptysis, snoring and reactive airway disease  : Hematuria, dysuria, frequency, urgency, nocturia and stress urinary incontinence   MS: Weakness, joint pain - improved and arthritis  ENDO: Diabetes, thyroid disease, polyuria, polydipsia, polyphagia, poor wound healing, heat intolerance, cold intolerance  LYMPH/HEME: Anemia, bruising and history of blood transfusions  NEURO: Dizziness, headache, fainting, seizures and stroke  PSYCH: Anxiety and depression    Physical Exam    Visit Vitals  /72 (BP 1 Location: Left arm, BP Patient Position: Sitting)   Pulse 72   Temp 98.6 °F (37 °C)   Resp 18   Ht 5' 11\" (1.803 m)   Wt (!) 177.7 kg (391 lb 12.8 oz)   SpO2 98%   BMI 54.65 kg/m²         General: 25 y.o.) male in no acute distress. Morbidly obese in abdomen, chest and hips - gynecoid pattern  HEENT: Normocephalic, atraumatic, Pupils equal and reactive, nasopharynx clear, oropharynx clear and moist without lesions  NECK: Supple, no lymphadenopathy, thyromegaly, carotid bruits or jugular venous distension. trachea midline  RESP: Clear to auscultation bilaterally, no wheezes, rhonchi, or rales, normal respiratory excursion  CV: Regular rate and rhythm, no murmurs, rubs or gallops. 3+/4 pulses in bilateral dorsalis pedis and posterior tibialis. No distal edema or varicosities. ABD: Soft, nontender, nondistended, normoactive bowel sounds, no hernias, no hepatosplenomegaly, easily palpable costal margins, gynecoid distribution  Extremities: Warm, well perfused, no tenderness or swelling, normal gait/station  Neuro: Sensation and strength grossly intact and symmetrical  Psych: Alert and oriented to person, place, and time.         Studies:    LABS: within normal limits except for hypovit D  EKG: without significant abnormalities  NUTRITIONAL EVALUATION has deemed the patient a good candidate for weight loss surgery - 40 lbs lost  PSYCHIATRIC EVALUATION has deemed the patient a good candidate for weight loss surgery    Impression:    Rita Marrero is a 26 y.o. male who is suffering from morbid obesity and comorbidities including weight related arthopathieswho would benefit from bariatric surgery. We've discussed the restrictive and malabsorptive nature of the gastric bypass and compared and contrasted with the sleeve gastrectomy. The patient understands the likelihood of losing approximately 80% of their excess weight in 12 to 18 months. He also understands the risks including but not limited to bleeding, infection, need for reoperation, anastomotic ulcers, leaks and strictures, bowel obstruction secondary to adhesions and internal hernias, DVT, PE, heart attack, stroke, and death. Patient also understands risks of inadequate weight loss, excess weight loss, vitamin insufficiency, protein malnutrition, excess skin, and loss of hair. We have reviewed the components of a successful postoperative course including requirement for a high protein, low carbohydrate diet, 60 oz a day of zero calorie liquids, daily vitamin supplementation, daily exercise, regular follow-up, and participation in support groups. We have reviewed the preoperative liver shrinking clear liquid diet, as well as reviewed any medication changes required while on the clear liquid diet. In addition, the patient understands that all medications to be taken during the first 8 weeks postoperatively can be taken whole as long as the medication is approximately the size of a [place] 325 mg aspirin tablet in size. The patient further understands that it is his/her responsibility to review these and verify with their primary care doctor and pharmacist that all medications are of the appropriate size. We will schedule the patient for laparoscopic gastric bypass in the near future.

## 2018-12-20 NOTE — H&P (VIEW-ONLY)
Preop History and Physical Exam: 
 
Raisa Guillen is a 26 y.o. male who comes into the office today after completing the entirety of the bariatric preoperative protocol satisfactorily. he initially identified obesity at early childhood and at age 26 weighed 400lbs. he has tried a variety of unsupervised weight-loss attempts, but has yet to meet with lasting success. Today, the patient is Height: 5' 11\" (180.3 cm) tall, Weight: (!) 177.7 kg (391 lb 12.8 oz) lbs for a Body mass index is 54.65 kg/m². It is due to their severe obesity, which is further complicated by weight related arthopathies  that the patient is now seeking out bariatric surgery, specifically, the gastric bypass Past Medical History:  
Diagnosis Date  Morbid obesity ([facility] [address] No past surgical history on file. Current Outpatient Medications Medication Sig Dispense Refill  cholecalciferol (VITAMIN D3) 1,000 unit tablet Take 5,000 Units by mouth daily.  naproxen sodium (ALEVE) 220 mg tablet Take 220 mg by mouth two (2) times daily (with meals). Allergies Allergen Reactions  Pcn [Penicillins] Hives Social History Tobacco Use  Smoking status: Never Smoker  Smokeless tobacco: Never Used Substance Use Topics  Alcohol use: Yes Frequency: Monthly or less Comment: occasional  
 Drug use: No  
 
 
Family History Problem Relation Age of Onset  Hypertension Mother  Hypertension Father Review of Systems:  Positive in BOLD 
 
CONST: Fever, weight loss, fatigue - improved or chills GI: Nausea, vomiting, abdominal pain, change in bowel habits, hematochezia, melena, and GERD INTEG: Dermatitis, abnormal moles HEENT: Recent changes in vision, vertigo, epistaxis, dysphagia and hoarseness CV: Chest pain, palpitations, HTN, edema and varicosities RESP: Cough, shortness of breath, wheezing, hemoptysis, snoring and reactive airway disease : Hematuria, dysuria, frequency, urgency, nocturia and stress urinary incontinence MS: Weakness, joint pain - improved and arthritis ENDO: Diabetes, thyroid disease, polyuria, polydipsia, polyphagia, poor wound healing, heat intolerance, cold intolerance LYMPH/HEME: Anemia, bruising and history of blood transfusions NEURO: Dizziness, headache, fainting, seizures and stroke PSYCH: Anxiety and depression Physical Exam 
 
Visit Vitals /72 (BP 1 Location: Left arm, BP Patient Position: Sitting) Pulse 72 Temp 98.6 °F (37 °C) Resp 18 Ht 5' 11\" (1.803 m) Wt (!) 177.7 kg (391 lb 12.8 oz) SpO2 98% BMI 54.65 kg/m² General: 25 y.o.) male in no acute distress. Morbidly obese in abdomen, chest and hips - gynecoid pattern HEENT: Normocephalic, atraumatic, Pupils equal and reactive, nasopharynx clear, oropharynx clear and moist without lesions NECK: Supple, no lymphadenopathy, thyromegaly, carotid bruits or jugular venous distension. trachea midline RESP: Clear to auscultation bilaterally, no wheezes, rhonchi, or rales, normal respiratory excursion CV: Regular rate and rhythm, no murmurs, rubs or gallops. 3+/4 pulses in bilateral dorsalis pedis and posterior tibialis. No distal edema or varicosities. ABD: Soft, nontender, nondistended, normoactive bowel sounds, no hernias, no hepatosplenomegaly, easily palpable costal margins, gynecoid distribution Extremities: Warm, well perfused, no tenderness or swelling, normal gait/station Neuro: Sensation and strength grossly intact and symmetrical 
Psych: Alert and oriented to person, place, and time. Studies: LABS: within normal limits except for hypovit D 
EKG: without significant abnormalities NUTRITIONAL EVALUATION has deemed the patient a good candidate for weight loss surgery - 40 lbs lost 
PSYCHIATRIC EVALUATION has deemed the patient a good candidate for weight loss surgery Impression: Karma Hart is a 26 y.o. male who is suffering from morbid obesity and comorbidities including weight related arthopathieswho would benefit from bariatric surgery. We've discussed the restrictive and malabsorptive nature of the gastric bypass and compared and contrasted with the sleeve gastrectomy. The patient understands the likelihood of losing approximately 80% of their excess weight in 12 to 18 months. He also understands the risks including but not limited to bleeding, infection, need for reoperation, anastomotic ulcers, leaks and strictures, bowel obstruction secondary to adhesions and internal hernias, DVT, PE, heart attack, stroke, and death. Patient also understands risks of inadequate weight loss, excess weight loss, vitamin insufficiency, protein malnutrition, excess skin, and loss of hair. We have reviewed the components of a successful postoperative course including requirement for a high protein, low carbohydrate diet, 60 oz a day of zero calorie liquids, daily vitamin supplementation, daily exercise, regular follow-up, and participation in support groups. We have reviewed the preoperative liver shrinking clear liquid diet, as well as reviewed any medication changes required while on the clear liquid diet. In addition, the patient understands that all medications to be taken during the first 8 weeks postoperatively can be taken whole as long as the medication is approximately the size of a [place] 325 mg aspirin tablet in size. The patient further understands that it is his/her responsibility to review these and verify with their primary care doctor and pharmacist that all medications are of the appropriate size. We will schedule the patient for laparoscopic gastric bypass in the near future.

## 2018-12-31 PROBLEM — R79.89 LOW VITAMIN D LEVEL: Status: ACTIVE | Noted: 2018-12-31

## 2019-01-03 ENCOUNTER — DOCUMENTATION ONLY (OUTPATIENT)
Dept: MEDSURG UNIT | Age: 24
End: 2019-01-03

## 2019-01-03 NOTE — PROGRESS NOTES
Patient attended pre op class with Tracie and Kylah Castellanos. Patient was educated on all pre op instructions below. A copy was provided. All questions were answered  7 day Pre-Op LIQUID Diet    Benefits:  o Reducing intake before surgery will shrink your liver by  depleting glycogen. (a form of stored energy)  o Reduced liver size gives better access to stomach during  surgery; which translates to a safer surgery. o Prevents the \"last supper\" syndrome  o Experiencing weight loss before the procedure encourages  post-operative compliance and \"jump-starts\" weight loss    Specifics:  o Start 7 days before surgery:  ____________  o NO SOLID FOODS!!  o Your surgery will be CANCELED if this diet is not followed!!!  o Minimum of 64oz. of fluid daily, including protein drinks.  o No added sugar or carbonated beverages  o Continue to take all your prescribed medication and your vitamin supplements during this preoperative diet phase. CLEAR LIQUIDS:   Water   Sugar free, non-carbonated beverages (crystal light, propel)   Sugar free popsicles   Sugar free Jell-O   Fat free, reduced sodium broth    Decaffeinated coffee or decaffeinated tea with artificial sweeteners. PROTEIN:   60 grams of protein daily (in liquid supplements)   Pre-made protein drinks   Protein powder added to water    3 gram rule: limit sugar and fat to less than 3 grams per 8oz.  4-6 oz. low fat/low sugar yogurt OR cottage cheese 3-4 times during the week      Following Gastric Bypass surgery you will no longer be able to take NSAIDs (Non-Steriodal Anti-Inflammatory Drugs) EVER again. NSAIDs are the leading cause of stomach ulcers following Gastric Bypass surgery. (Patients having the Gastric Sleeve will not be able to take NSAIDs for 6 weeks following surgery.)      MOST COMMONLY TAKEN    NSAIDs    to be AVOIDED!! 1. Ibuprofen  2. Advil  3. Motrin  4. Excedrin  5. Aspirin  6. Celebrex  7. Naproxen  8. Aleve  9. Voltaren  10. Mobic    Attached is an extensive list of additional NSAIDs that cannot be taken following surgery. Please be sure to review this list when you are being prescribed new medications. This list covers the majority of NSAIDs on the market. Be aware that additional NSAIDs do exist and new medications are being introduced regularly. You must be your own advocate!! Non-Steriodal Anti-Inflammatory Drugs (NSAIDs)  ? The following is a list of NSAIDS that are NEVER to be taken again after Gastric Bypass surgery    Ibuprofen which is also known as : Advil, Advil Childrens, Advil Hernan Strength, Advil Liquigel, Advil Migraine, Advil Pediatric, Childrens Ibuprofen Berry, Genpril, IBU, Midol IB, Midol Maximum Strength Cramp Formula, Dolgesic, Motrin Childrens, Motrin IB, Motrin Infant Drops, Motrin Hernan Strength, Motrin Migraine Pain, Nuprin, Migraine Liqui-gels, Ibu-Tab 200, Cap-Profen, Tab-Profen, Profen, Ibuprohm, Childrens Elixsure, IB Pro, Vicoprofen, Combunox, A-G Profen, Actiprofen, Addaprin, Advil Infants Concentrated Drops, Caldolor, Flomaton, Q-Profen, Ibifon 600, Ibren, Rosales, Midol Cramps & Bodyaches, [place], Melissa, [place] de Kruger, McG[place]or, Uni-Pro, Wal-Profen    Aspirin which has the brand name: Arthritis Pain, Aspergum, Aspir-Low, Aspirin Lite Coat, [place]iana Aspirin, Bufferin, Easprin, Ecotrin, Empirin, Fasprin, [place], Halfprin, [place] Aspirin, [place] Aspirin, Excedrin    Ketoprofen which is known as: Orudis KT, Oruvail, Actron. Sulindac which is sold as: Clinoril.     Naproxen is one of the most common NSAIDs, and is sold as: Aleve, Naprosyn, EC-Naprosyn, Naprelan, Anaprox, All Day Pain Relief, Aflaxen, All Day Relief, Anaprox-DS, Comfort Pac  With Naproxen,  Aleve Caplet, Aleve Easy Open Arthritis, Aleve Gelcap,  Anaprox-DS, EC-Naprosyn, Leader Naproxen Sodium, Midol Extended Relief, Naprelan 375?, Naprelan 500?, Naprelan 750?, Prevacid NapraPac 375,  Prevacid NapraPac, Naprapac, Vimovo. Etodolac is sold under the brand name: Lodine XL, Lodine. Fenoprofen can be found on the market as: Nalfon, Nalfon 200. Diclofenac sold as: Arthrotec, Cataflam, Voltaren, Cambia, Voltaren-XR, Zipsor. Flurbiprofen sold as: Asaid. Ketorolac is sold under the brand name: Sprix, Toradol, Toradol IM, Toradol IV/IM. Piroxicam is found on the market as: Feldene. Indomethacin known as: Indocin SR, Indocin, Indo-Gigi, Indomethegan, Indocin IV. Mefenamic Acid sold as: Ponstel. Meloxicam is sold under the brand name: Mobic    Nabumetone which is sold as: Relafen. Oxaprozin which has the brand name: Daypro    Ketoprofen which has the brand name: Actron, Orudis KT, Orudis, Oruvail    Famotidine and Ibuprofen can be found as: Duexis    Meclofenamate sold as: Meclomen    Tolmetin which can be found on the marked as: Tolectin, Tolectin 600, Tolectin DS    Salsalate which is sold as: Disalcid. Celecoxib which is sold as: Celebrex      Patients name: ________________________________  Date of surgery: ____________    Pre-op instructions:  1. Shower with the antibacterial soap  the 3 days prior to surgery. 2. Pre-admission testing will contact you 1 week before surgery  3. [facility] will contact you the day before surgery to confirm your surgery time.  Email or call your surgery scheduler if you have not heard from them by 3pm  4. Nothing to eat or drink (NPO) after midnight the night before surgery.  Take any evening medications by 11pm  5. Wear comfortable clothing. 6. Do not bring any valuables. 7. Bring insurance card, prescription card, photo ID and credit card to the hospital.  **Discuss all home medications with your surgeon at you pre-op appointment. Your surgeon will inform you of what medications to stop before surgery and what medications to take the day of surgery.     **STOP all NSAIDS (Ibuprofen, Aleve, Advil, Naprosyn etc.) and Aspirin 7 days before your surgery      Important Information:  1. No lifting over 15 lbs. for 6 weeks post-op  2. No driving for 6-57 days after surgery - must be off pain medication. 3. No smoking EVER again! 4. You will NOT be able to take any Non-Steroidal Anti-inflammatories (NSAIDS), Aspirin or Steroids  a. Bypass/revision patients - EVER again. (Tylenol only)  b. Sleeve patients - 6 weeks after surgery  5. Pulse/temperature- twice daily for 2 weeks post-op   6. Avoid pregnancy for 18 months  7. Key Diet principles:  a. Vitamin/mineral supplements-[place] Complete, Calcium citrate, Vitamin D3, Vitamin B12  b.  Protein supplements - 60-70 grams/day  c. Minimum 64 oz. fluid per day      What to Expect in the Hospital:  Pre-op area:  o Emergency Room  take elevator 2nd floor  o Arrive 2 hours before surgery  o Lovenox (blood thinner)  o Incentive Spirometer  o SCDs  o Sign consent  o Anesthesia  Start IV    Operating Room:    o Gastric bypass: 2- 2 ½ hours  o Sleeve gastrectomy: 1-1 ½ hours  o Revision: 2-3 hours    PACU(Post Anesthesia Care Unit):  o Nasal cannula  o EKG monitor  o Blood pressure cuff  o Pulse Ox  o Cronin Catheter  o SCDs  o No family allowed  o Minimum one hour      2 Central (Day of Surgery):  o Private room  o 1-2 oz. ice chips per hour   o Pain Management ( Three Tier)  Tylenol given first- 650 mg PO  Oxycodone 5 mg PO   Dilaudid IV  Remember other alternatives to pain medication   Get patient out of bed and walking this helps tremendously  Position change or get patient up to the chair  Ice Camilo to Abdomen  o Void  o Walk within 4 hours  o One family member can spend the night (must 18 years or older)  o Cell phone/computers - OK    2 Central (Post-op Day 1/Post-op Day 2):  o Discontinue -nasal cannula and heart rate monitor  o Start bariatric clear liquid diet - water, crystal light, broth, Jell-O and protein supplement  o Slowly increase to 3 oz. clear liquids and 1 oz. protein supplement per hour  o Oral pain medication as needed for pain - communicate with your nurse  o Goal:  48 to 64 ounces, clear liquid per day preferable water  o Discharge instructions/Lovenox self-injection instructions   o WALK & WATER    Post-op Goals:  1. Void within 8 hours of your catheter being removed  2. Pain is well controlled with oral pain medication  3. Nausea is under control/No vomiting  4. Tolerating 3 oz. of clear liquids and 1 oz. protein supplement per hour for 3 consecutive hours. Post-op Follow-up Labs will need to be completed 1-2 weeks BEFORE your 3 month, 6 month and yearly visits. These labs are required and your appointment will be rescheduled if they are not completed. My surgical procedure and post-operative hospital stay has been discussed with me and I have been given the opportunity to ask any questions I may have. Patient Signature: ____________________________  Date: _____________________    THINGS I NEED TO KNOW BEFORE SURGERY  1. How many ounces of fluid will you need to drink every day after surgery? _______________    2. List 3 examples of bariatric clear liquids. ________________________  ________________________  ________________________  3. What is the 3 gram rule?   ______________________________________________________________      4. What is the 30 minute rule?  ______________________________________________________________      5. What are examples of food you will be able to eat on the bariatric soft and moist diet?  _________________________  _________________________  _________________________  6. After surgery I will be able to use a straw. TRUE    FALSE    7. After surgery I will NOT be able to drink carbonated beverages. TRUE    FALSE  8. After surgery I will be able to drink caffeine. TRUE   FALSE    9.  What 4 vitamins will you take after surgery?  ______________________           _________________________  _____________________ _________________________  10. How much exercise should you get daily? _________________    11. How long should it take you to eat a meal? ________________    12. The Calcium I have purchased is: ______________________. This has ________ mg per serving. I will need to take this ________ times a day. 13. The protein drink I have decided on is: ______________. This has _________ grams of protein. I will need to drink ______ of my protein drinks during the full liquid phase and _____ during the soft protein phase. My protein drinks will give me ______ ounces of fluid. 14. After having a sleeve gastrectomy I will not be able to take NSAIDs (Non-Steroidal Anti-inflammatory Drugs) for ______ weeks. 15. After having a gastric bypass I will not be able to take NSAIDs (Non-Steroidal Anti-Inflammatory Drugs) for _____________. PRE-OP CHECKLIST    THINGS TO DO AT MY PRE-OP APPOINTMENT WITH MY SURGEON:  ? Discuss ALL my current medications with my surgeon. Know what medications needs to be stopped before surgery AND what medications need to be taken on the morning of surgery. ? blood pressure/diuretic medications. ? Coumadin, Plavix or other blood thinners    ? Stop the following diabetic medications (if applicable): ____________________________________________________on: ______________  ? Use Regular Insulin (Novolog Pen) according to the following sliding scale: Insulin Sliding Scale  Blood sugar:  Amount of insulin:  Under 150  no insulin  150-200  2 units  201-250  4 units  251-300  6 units  301-350  8 units  351-400  10 units  400 or greater 12 units and call physician [phone]    THINGS TO DO FOLLOWING the PRE-OP CLASS:  ? Read through all information given to me by:  ? My dietitian Britney Paulino or [place])  ? Sarah/Tracie at the Pre-op class    ?  Contact my insurance company to find out the out of pocket cost of Lovenox (enoxaparin). $____________      THINGS TO DO BEFORE SURGERY:    ? Start the pre-op liquid diet on: ___________    ? Stop all NSAIDS (see attached sheet with list of NSAIDs) and Aspirin 7 days before my surgery: ___________  ? Contact my doctor(PCP or surgeon) regarding stopping Coumadin, Plavix or other blood thinners    ? Purchase bariatric clear liquids (Crystal Lite, sugar free Jell-O, broth, sugar free popsicles, protein supplement) and bariatric soft and moist foods (low fat yogurt, cottage cheese, eggs, tuna, fish, chicken) so that Im prepared when I get home from the hospital.     ? Purchase all vitamins that will be required following surgery. 1. Chewable multivitamin - 2 per day(ex: Flintstones)  2. Calcium Citrate - 1500mg (500mg 3 times a day)  3. Vitamin B12 - 1000mcg daily  4. Vitamin D3 - 5000IU daily    ? Create a system to keep track of how many ounces of fluid I am drinking daily. ? Post-op GOAL: 64oz per day    ? Purchase a protein supplement that I like:  ? Brand: ______________    ? Ounces: __________   ?  Grams of protein per serving: _________

## 2019-01-08 ENCOUNTER — ANESTHESIA EVENT (OUTPATIENT)
Dept: SURGERY | Age: 24
DRG: 621 | End: 2019-01-08
Payer: COMMERCIAL

## 2019-01-09 ENCOUNTER — ANESTHESIA (OUTPATIENT)
Dept: SURGERY | Age: 24
DRG: 621 | End: 2019-01-09
Payer: COMMERCIAL

## 2019-01-09 ENCOUNTER — HOSPITAL ENCOUNTER (INPATIENT)
Age: 24
LOS: 1 days | Discharge: HOME | DRG: 621 | End: 2019-01-10
Attending: SURGERY | Admitting: SURGERY
Payer: COMMERCIAL

## 2019-01-09 DIAGNOSIS — E66.01 MORBID OBESITY (HCC): Primary | ICD-10-CM

## 2019-01-09 PROCEDURE — 77030036732 HC RELD STPLR VASC J&J -F: Performed by: SURGERY

## 2019-01-09 PROCEDURE — 74011000250 HC RX REV CODE- 250

## 2019-01-09 PROCEDURE — 77030002996 HC SUT SLK J&J -A: Performed by: SURGERY

## 2019-01-09 PROCEDURE — 77030008598 HC TRCR ENDOSC BLDLS J&J -B: Performed by: SURGERY

## 2019-01-09 PROCEDURE — 74011250636 HC RX REV CODE- 250/636: Performed by: SURGERY

## 2019-01-09 PROCEDURE — 74011250636 HC RX REV CODE- 250/636: Performed by: NURSE ANESTHETIST, CERTIFIED REGISTERED

## 2019-01-09 PROCEDURE — 74011250636 HC RX REV CODE- 250/636: Performed by: ANESTHESIOLOGY

## 2019-01-09 PROCEDURE — 77030037892: Performed by: SURGERY

## 2019-01-09 PROCEDURE — 77030027491 HC SHR ENDOSC COVD -B: Performed by: SURGERY

## 2019-01-09 PROCEDURE — 77030027876 HC STPLR ENDOSC FLX PWR J&J -G1: Performed by: SURGERY

## 2019-01-09 PROCEDURE — 77030002933 HC SUT MCRYL J&J -A: Performed by: SURGERY

## 2019-01-09 PROCEDURE — 77030008437 HC REINF STRP REINF SEMGD WLGO -C: Performed by: SURGERY

## 2019-01-09 PROCEDURE — 77030033639 HC SHR ENDO COAG HARM 36 J&J -E: Performed by: SURGERY

## 2019-01-09 PROCEDURE — 77030033271 HC TRCR ENDOSC EPATH2 J&J -B: Performed by: SURGERY

## 2019-01-09 PROCEDURE — 74011250637 HC RX REV CODE- 250/637

## 2019-01-09 PROCEDURE — 74011250637 HC RX REV CODE- 250/637: Performed by: SURGERY

## 2019-01-09 PROCEDURE — 77030035048 HC TRCR ENDOSC OPTCL COVD -B: Performed by: SURGERY

## 2019-01-09 PROCEDURE — 0D164ZA BYPASS STOMACH TO JEJUNUM, PERCUTANEOUS ENDOSCOPIC APPROACH: ICD-10-PCS | Performed by: SURGERY

## 2019-01-09 PROCEDURE — 74011000250 HC RX REV CODE- 250: Performed by: SURGERY

## 2019-01-09 PROCEDURE — 74011000258 HC RX REV CODE- 258: Performed by: SURGERY

## 2019-01-09 PROCEDURE — 74011000272 HC RX REV CODE- 272: Performed by: SURGERY

## 2019-01-09 PROCEDURE — 74011250636 HC RX REV CODE- 250/636

## 2019-01-09 PROCEDURE — 77030035051: Performed by: SURGERY

## 2019-01-09 PROCEDURE — 77030031139 HC SUT VCRL2 J&J -A: Performed by: SURGERY

## 2019-01-09 PROCEDURE — 76010000133 HC OR TIME 3 TO 3.5 HR: Performed by: SURGERY

## 2019-01-09 PROCEDURE — 77030018831 HC SOL IRR H20 BAXT -A: Performed by: SURGERY

## 2019-01-09 PROCEDURE — 77030009968 HC RELD STPLR ENDOSC J&J -D: Performed by: SURGERY

## 2019-01-09 PROCEDURE — 77030008683 HC TU ET CUF COVD -A: Performed by: ANESTHESIOLOGY

## 2019-01-09 PROCEDURE — 77030032490 HC SLV COMPR SCD KNE COVD -B: Performed by: SURGERY

## 2019-01-09 PROCEDURE — 76210000017 HC OR PH I REC 1.5 TO 2 HR: Performed by: SURGERY

## 2019-01-09 PROCEDURE — 65270000029 HC RM PRIVATE

## 2019-01-09 PROCEDURE — 77030013079 HC BLNKT BAIR HGGR 3M -A: Performed by: ANESTHESIOLOGY

## 2019-01-09 PROCEDURE — 77030016151 HC PROTCTR LNS DFOG COVD -B: Performed by: SURGERY

## 2019-01-09 PROCEDURE — 77030005518 HC CATH URETH FOL 2W BARD -B: Performed by: SURGERY

## 2019-01-09 PROCEDURE — 77030021678 HC GLIDESCP STAT DISP VERT -B: Performed by: ANESTHESIOLOGY

## 2019-01-09 PROCEDURE — 77030008477 HC STYL SATN SLP COVD -A: Performed by: ANESTHESIOLOGY

## 2019-01-09 PROCEDURE — 77030039266 HC ADH SKN EXOFIN S2SG -A: Performed by: SURGERY

## 2019-01-09 PROCEDURE — 76060000037 HC ANESTHESIA 3 TO 3.5 HR: Performed by: SURGERY

## 2019-01-09 RX ORDER — VECURONIUM BROMIDE FOR INJECTION 1 MG/ML
INJECTION, POWDER, LYOPHILIZED, FOR SOLUTION INTRAVENOUS AS NEEDED
Status: DISCONTINUED | OUTPATIENT
Start: 2019-01-09 | End: 2019-01-09 | Stop reason: HOSPADM

## 2019-01-09 RX ORDER — FAMOTIDINE 20 MG/1
20 TABLET, FILM COATED ORAL ONCE
Status: DISCONTINUED | OUTPATIENT
Start: 2019-01-10 | End: 2019-01-09

## 2019-01-09 RX ORDER — HYDROMORPHONE HYDROCHLORIDE 2 MG/ML
1 INJECTION, SOLUTION INTRAMUSCULAR; INTRAVENOUS; SUBCUTANEOUS
Status: DISCONTINUED | OUTPATIENT
Start: 2019-01-09 | End: 2019-01-09 | Stop reason: RX

## 2019-01-09 RX ORDER — GLYCOPYRROLATE 0.2 MG/ML
INJECTION INTRAMUSCULAR; INTRAVENOUS AS NEEDED
Status: DISCONTINUED | OUTPATIENT
Start: 2019-01-09 | End: 2019-01-09 | Stop reason: HOSPADM

## 2019-01-09 RX ORDER — SODIUM CHLORIDE, SODIUM LACTATE, POTASSIUM CHLORIDE, CALCIUM CHLORIDE 600; 310; 30; 20 MG/100ML; MG/100ML; MG/100ML; MG/100ML
INJECTION, SOLUTION INTRAVENOUS
Status: DISCONTINUED | OUTPATIENT
Start: 2019-01-09 | End: 2019-01-09 | Stop reason: HOSPADM

## 2019-01-09 RX ORDER — ACETAMINOPHEN 325 MG/1
650 TABLET ORAL EVERY 6 HOURS
Status: DISCONTINUED | OUTPATIENT
Start: 2019-01-09 | End: 2019-01-10 | Stop reason: HOSPADM

## 2019-01-09 RX ORDER — FENTANYL CITRATE 50 UG/ML
50 INJECTION, SOLUTION INTRAMUSCULAR; INTRAVENOUS AS NEEDED
Status: DISCONTINUED | OUTPATIENT
Start: 2019-01-09 | End: 2019-01-09 | Stop reason: HOSPADM

## 2019-01-09 RX ORDER — ACETAMINOPHEN 650 MG/1
650 SUPPOSITORY RECTAL ONCE
Status: COMPLETED | OUTPATIENT
Start: 2019-01-09 | End: 2019-01-09

## 2019-01-09 RX ORDER — DIPHENHYDRAMINE HYDROCHLORIDE 50 MG/ML
25 INJECTION, SOLUTION INTRAMUSCULAR; INTRAVENOUS
Status: DISCONTINUED | OUTPATIENT
Start: 2019-01-09 | End: 2019-01-10 | Stop reason: HOSPADM

## 2019-01-09 RX ORDER — SODIUM CHLORIDE 0.9 % (FLUSH) 0.9 %
5-40 SYRINGE (ML) INJECTION AS NEEDED
Status: DISCONTINUED | OUTPATIENT
Start: 2019-01-09 | End: 2019-01-09 | Stop reason: HOSPADM

## 2019-01-09 RX ORDER — OXYCODONE HYDROCHLORIDE 5 MG/1
5 TABLET ORAL
Status: DISCONTINUED | OUTPATIENT
Start: 2019-01-09 | End: 2019-01-10 | Stop reason: HOSPADM

## 2019-01-09 RX ORDER — HYDROMORPHONE HYDROCHLORIDE 1 MG/ML
1 INJECTION, SOLUTION INTRAMUSCULAR; INTRAVENOUS; SUBCUTANEOUS
Status: DISCONTINUED | OUTPATIENT
Start: 2019-01-09 | End: 2019-01-10 | Stop reason: HOSPADM

## 2019-01-09 RX ORDER — SODIUM CHLORIDE, SODIUM LACTATE, POTASSIUM CHLORIDE, CALCIUM CHLORIDE 600; 310; 30; 20 MG/100ML; MG/100ML; MG/100ML; MG/100ML
50 INJECTION, SOLUTION INTRAVENOUS CONTINUOUS
Status: DISCONTINUED | OUTPATIENT
Start: 2019-01-10 | End: 2019-01-09 | Stop reason: HOSPADM

## 2019-01-09 RX ORDER — HYOSCYAMINE SULFATE 0.12 MG/1
TABLET, ORALLY DISINTEGRATING ORAL
Status: COMPLETED
Start: 2019-01-09 | End: 2019-01-09

## 2019-01-09 RX ORDER — DEXAMETHASONE SODIUM PHOSPHATE 4 MG/ML
INJECTION, SOLUTION INTRA-ARTICULAR; INTRALESIONAL; INTRAMUSCULAR; INTRAVENOUS; SOFT TISSUE AS NEEDED
Status: DISCONTINUED | OUTPATIENT
Start: 2019-01-09 | End: 2019-01-09 | Stop reason: HOSPADM

## 2019-01-09 RX ORDER — LIDOCAINE HYDROCHLORIDE 20 MG/ML
INJECTION, SOLUTION EPIDURAL; INFILTRATION; INTRACAUDAL; PERINEURAL AS NEEDED
Status: DISCONTINUED | OUTPATIENT
Start: 2019-01-09 | End: 2019-01-09 | Stop reason: HOSPADM

## 2019-01-09 RX ORDER — FENTANYL CITRATE 50 UG/ML
INJECTION, SOLUTION INTRAMUSCULAR; INTRAVENOUS AS NEEDED
Status: DISCONTINUED | OUTPATIENT
Start: 2019-01-09 | End: 2019-01-09 | Stop reason: HOSPADM

## 2019-01-09 RX ORDER — SODIUM CHLORIDE, SODIUM LACTATE, POTASSIUM CHLORIDE, CALCIUM CHLORIDE 600; 310; 30; 20 MG/100ML; MG/100ML; MG/100ML; MG/100ML
150 INJECTION, SOLUTION INTRAVENOUS CONTINUOUS
Status: DISCONTINUED | OUTPATIENT
Start: 2019-01-09 | End: 2019-01-10 | Stop reason: HOSPADM

## 2019-01-09 RX ORDER — SUCCINYLCHOLINE CHLORIDE 20 MG/ML
INJECTION INTRAMUSCULAR; INTRAVENOUS AS NEEDED
Status: DISCONTINUED | OUTPATIENT
Start: 2019-01-09 | End: 2019-01-09 | Stop reason: HOSPADM

## 2019-01-09 RX ORDER — KETOROLAC TROMETHAMINE 30 MG/ML
INJECTION, SOLUTION INTRAMUSCULAR; INTRAVENOUS
Status: COMPLETED
Start: 2019-01-09 | End: 2019-01-09

## 2019-01-09 RX ORDER — KETOROLAC TROMETHAMINE 30 MG/ML
15 INJECTION, SOLUTION INTRAMUSCULAR; INTRAVENOUS EVERY 6 HOURS
Status: DISCONTINUED | OUTPATIENT
Start: 2019-01-09 | End: 2019-01-10 | Stop reason: HOSPADM

## 2019-01-09 RX ORDER — HYOSCYAMINE SULFATE 0.12 MG/1
0.12 TABLET, ORALLY DISINTEGRATING ORAL
Status: DISCONTINUED | OUTPATIENT
Start: 2019-01-09 | End: 2019-01-10 | Stop reason: HOSPADM

## 2019-01-09 RX ORDER — ONDANSETRON 2 MG/ML
4 INJECTION INTRAMUSCULAR; INTRAVENOUS
Status: DISCONTINUED | OUTPATIENT
Start: 2019-01-09 | End: 2019-01-10 | Stop reason: HOSPADM

## 2019-01-09 RX ORDER — ENOXAPARIN SODIUM 100 MG/ML
40 INJECTION SUBCUTANEOUS DAILY
Status: DISCONTINUED | OUTPATIENT
Start: 2019-01-10 | End: 2019-01-10 | Stop reason: HOSPADM

## 2019-01-09 RX ORDER — HYDROMORPHONE HYDROCHLORIDE 1 MG/ML
INJECTION, SOLUTION INTRAMUSCULAR; INTRAVENOUS; SUBCUTANEOUS AS NEEDED
Status: DISCONTINUED | OUTPATIENT
Start: 2019-01-09 | End: 2019-01-09 | Stop reason: HOSPADM

## 2019-01-09 RX ORDER — MIDAZOLAM HYDROCHLORIDE 1 MG/ML
INJECTION, SOLUTION INTRAMUSCULAR; INTRAVENOUS AS NEEDED
Status: DISCONTINUED | OUTPATIENT
Start: 2019-01-09 | End: 2019-01-09 | Stop reason: HOSPADM

## 2019-01-09 RX ORDER — HYDROMORPHONE HYDROCHLORIDE 2 MG/ML
0.5 INJECTION, SOLUTION INTRAMUSCULAR; INTRAVENOUS; SUBCUTANEOUS
Status: DISCONTINUED | OUTPATIENT
Start: 2019-01-09 | End: 2019-01-09 | Stop reason: HOSPADM

## 2019-01-09 RX ORDER — PROPOFOL 10 MG/ML
INJECTION, EMULSION INTRAVENOUS AS NEEDED
Status: DISCONTINUED | OUTPATIENT
Start: 2019-01-09 | End: 2019-01-09 | Stop reason: HOSPADM

## 2019-01-09 RX ORDER — SODIUM CHLORIDE 0.9 % (FLUSH) 0.9 %
5-40 SYRINGE (ML) INJECTION EVERY 8 HOURS
Status: DISCONTINUED | OUTPATIENT
Start: 2019-01-09 | End: 2019-01-09 | Stop reason: HOSPADM

## 2019-01-09 RX ORDER — ONDANSETRON 2 MG/ML
4 INJECTION INTRAMUSCULAR; INTRAVENOUS ONCE
Status: COMPLETED | OUTPATIENT
Start: 2019-01-09 | End: 2019-01-09

## 2019-01-09 RX ORDER — ONDANSETRON 2 MG/ML
INJECTION INTRAMUSCULAR; INTRAVENOUS AS NEEDED
Status: DISCONTINUED | OUTPATIENT
Start: 2019-01-09 | End: 2019-01-09 | Stop reason: HOSPADM

## 2019-01-09 RX ORDER — FAMOTIDINE 10 MG/ML
INJECTION INTRAVENOUS
Status: DISCONTINUED
Start: 2019-01-09 | End: 2019-01-09

## 2019-01-09 RX ORDER — NEOSTIGMINE METHYLSULFATE 5 MG/5 ML
SYRINGE (ML) INTRAVENOUS AS NEEDED
Status: DISCONTINUED | OUTPATIENT
Start: 2019-01-09 | End: 2019-01-09 | Stop reason: HOSPADM

## 2019-01-09 RX ORDER — SODIUM CHLORIDE, SODIUM LACTATE, POTASSIUM CHLORIDE, CALCIUM CHLORIDE 600; 310; 30; 20 MG/100ML; MG/100ML; MG/100ML; MG/100ML
125 INJECTION, SOLUTION INTRAVENOUS CONTINUOUS
Status: DISCONTINUED | OUTPATIENT
Start: 2019-01-09 | End: 2019-01-09 | Stop reason: HOSPADM

## 2019-01-09 RX ORDER — CLINDAMYCIN PHOSPHATE 900 MG/50ML
900 INJECTION INTRAVENOUS ONCE
Status: COMPLETED | OUTPATIENT
Start: 2019-01-09 | End: 2019-01-09

## 2019-01-09 RX ORDER — ENOXAPARIN SODIUM 100 MG/ML
40 INJECTION SUBCUTANEOUS ONCE
Status: COMPLETED | OUTPATIENT
Start: 2019-01-09 | End: 2019-01-09

## 2019-01-09 RX ORDER — NALOXONE HYDROCHLORIDE 0.4 MG/ML
0.4 INJECTION, SOLUTION INTRAMUSCULAR; INTRAVENOUS; SUBCUTANEOUS AS NEEDED
Status: DISCONTINUED | OUTPATIENT
Start: 2019-01-09 | End: 2019-01-10 | Stop reason: HOSPADM

## 2019-01-09 RX ADMIN — VECURONIUM BROMIDE FOR INJECTION 3 MG: 1 INJECTION, POWDER, LYOPHILIZED, FOR SOLUTION INTRAVENOUS at 13:37

## 2019-01-09 RX ADMIN — PROPOFOL 200 MG: 10 INJECTION, EMULSION INTRAVENOUS at 12:44

## 2019-01-09 RX ADMIN — SODIUM CHLORIDE, SODIUM LACTATE, POTASSIUM CHLORIDE, AND CALCIUM CHLORIDE 150 ML/HR: 600; 310; 30; 20 INJECTION, SOLUTION INTRAVENOUS at 18:05

## 2019-01-09 RX ADMIN — CLINDAMYCIN PHOSPHATE 900 MG: 900 INJECTION INTRAVENOUS at 13:00

## 2019-01-09 RX ADMIN — SUCCINYLCHOLINE CHLORIDE 100 MG: 20 INJECTION INTRAMUSCULAR; INTRAVENOUS at 12:44

## 2019-01-09 RX ADMIN — ONDANSETRON 4 MG: 2 INJECTION INTRAMUSCULAR; INTRAVENOUS at 16:09

## 2019-01-09 RX ADMIN — GLYCOPYRROLATE 0.4 MG: 0.2 INJECTION INTRAMUSCULAR; INTRAVENOUS at 15:25

## 2019-01-09 RX ADMIN — SODIUM CHLORIDE, SODIUM LACTATE, POTASSIUM CHLORIDE, CALCIUM CHLORIDE: 600; 310; 30; 20 INJECTION, SOLUTION INTRAVENOUS at 13:47

## 2019-01-09 RX ADMIN — ONDANSETRON 4 MG: 2 INJECTION INTRAMUSCULAR; INTRAVENOUS at 13:13

## 2019-01-09 RX ADMIN — FENTANYL CITRATE 100 MCG: 50 INJECTION, SOLUTION INTRAMUSCULAR; INTRAVENOUS at 13:39

## 2019-01-09 RX ADMIN — VECURONIUM BROMIDE FOR INJECTION 2 MG: 1 INJECTION, POWDER, LYOPHILIZED, FOR SOLUTION INTRAVENOUS at 13:14

## 2019-01-09 RX ADMIN — DEXAMETHASONE SODIUM PHOSPHATE 4 MG: 4 INJECTION, SOLUTION INTRA-ARTICULAR; INTRALESIONAL; INTRAMUSCULAR; INTRAVENOUS; SOFT TISSUE at 13:13

## 2019-01-09 RX ADMIN — MIDAZOLAM HYDROCHLORIDE 2 MG: 1 INJECTION, SOLUTION INTRAMUSCULAR; INTRAVENOUS at 12:39

## 2019-01-09 RX ADMIN — AZTREONAM 2 G: 2 INJECTION, POWDER, LYOPHILIZED, FOR SOLUTION INTRAMUSCULAR; INTRAVENOUS at 10:30

## 2019-01-09 RX ADMIN — SODIUM CHLORIDE, SODIUM LACTATE, POTASSIUM CHLORIDE, AND CALCIUM CHLORIDE 150 ML/HR: 600; 310; 30; 20 INJECTION, SOLUTION INTRAVENOUS at 22:07

## 2019-01-09 RX ADMIN — FENTANYL CITRATE 100 MCG: 50 INJECTION, SOLUTION INTRAMUSCULAR; INTRAVENOUS at 12:44

## 2019-01-09 RX ADMIN — FENTANYL CITRATE 50 MCG: 50 INJECTION, SOLUTION INTRAMUSCULAR; INTRAVENOUS at 16:52

## 2019-01-09 RX ADMIN — HYDROMORPHONE HYDROCHLORIDE 1 MG: 1 INJECTION, SOLUTION INTRAMUSCULAR; INTRAVENOUS; SUBCUTANEOUS at 13:24

## 2019-01-09 RX ADMIN — KETOROLAC TROMETHAMINE 15 MG: 30 INJECTION, SOLUTION INTRAMUSCULAR at 17:21

## 2019-01-09 RX ADMIN — KETOROLAC TROMETHAMINE 15 MG: 30 INJECTION, SOLUTION INTRAMUSCULAR at 23:43

## 2019-01-09 RX ADMIN — Medication 3 MG: at 15:25

## 2019-01-09 RX ADMIN — ACETAMINOPHEN 650 MG: 325 TABLET, FILM COATED ORAL at 18:05

## 2019-01-09 RX ADMIN — ENOXAPARIN SODIUM 40 MG: 40 INJECTION, SOLUTION INTRAVENOUS; SUBCUTANEOUS at 09:48

## 2019-01-09 RX ADMIN — SODIUM CHLORIDE, SODIUM LACTATE, POTASSIUM CHLORIDE, CALCIUM CHLORIDE: 600; 310; 30; 20 INJECTION, SOLUTION INTRAVENOUS at 12:55

## 2019-01-09 RX ADMIN — SODIUM CHLORIDE, SODIUM LACTATE, POTASSIUM CHLORIDE, AND CALCIUM CHLORIDE 50 ML/HR: 600; 310; 30; 20 INJECTION, SOLUTION INTRAVENOUS at 10:23

## 2019-01-09 RX ADMIN — FENTANYL CITRATE 50 MCG: 50 INJECTION, SOLUTION INTRAMUSCULAR; INTRAVENOUS at 16:10

## 2019-01-09 RX ADMIN — ACETAMINOPHEN 650 MG: 325 TABLET, FILM COATED ORAL at 23:43

## 2019-01-09 RX ADMIN — FENTANYL CITRATE 50 MCG: 50 INJECTION, SOLUTION INTRAMUSCULAR; INTRAVENOUS at 16:27

## 2019-01-09 RX ADMIN — FAMOTIDINE 20 MG: 10 INJECTION, SOLUTION INTRAVENOUS at 10:23

## 2019-01-09 RX ADMIN — SODIUM CHLORIDE, SODIUM LACTATE, POTASSIUM CHLORIDE, AND CALCIUM CHLORIDE: 600; 310; 30; 20 INJECTION, SOLUTION INTRAVENOUS at 14:59

## 2019-01-09 RX ADMIN — OXYCODONE HYDROCHLORIDE 5 MG: 5 TABLET ORAL at 22:08

## 2019-01-09 RX ADMIN — VECURONIUM BROMIDE FOR INJECTION 4 MG: 1 INJECTION, POWDER, LYOPHILIZED, FOR SOLUTION INTRAVENOUS at 12:45

## 2019-01-09 RX ADMIN — HYDROMORPHONE HYDROCHLORIDE 1 MG: 1 INJECTION, SOLUTION INTRAMUSCULAR; INTRAVENOUS; SUBCUTANEOUS at 13:18

## 2019-01-09 RX ADMIN — HYOSCYAMINE SULFATE 0.12 MG: 0.12 TABLET, ORALLY DISINTEGRATING ORAL at 17:20

## 2019-01-09 RX ADMIN — LIDOCAINE HYDROCHLORIDE 50 MG: 20 INJECTION, SOLUTION EPIDURAL; INFILTRATION; INTRACAUDAL; PERINEURAL at 12:44

## 2019-01-09 RX ADMIN — VECURONIUM BROMIDE FOR INJECTION 1 MG: 1 INJECTION, POWDER, LYOPHILIZED, FOR SOLUTION INTRAVENOUS at 12:44

## 2019-01-09 NOTE — OP NOTES
DATE: 1/9/2019    PREOPERATIVE DIAGNOSIS: Clinically severe obesity, body mass index of 55,   comorbidities of weight related arthopathies      . POSTOPERATIVE DIAGNOSIS: Clinically severe obesity, body mass index of 55,   comorbidities of weight related arthopathies        PROCEDURES: Laparoscopic Dianna-en-Y gastric bypass with 535 cm retrocolic   retrogastric Dianna limb, 40 cm biliopancreatic limb, 15 ml    tubularized gastric pouch applied to the lesser curvature of stomach  SURGEON: Dr. Kristy Cordoba. MD Willard, FACS   ASSISTANT: Grover Goetz SA  ANESTHESIA: General endotracheal anesthesia. Local anesthetic mixture 1%   lidocaine, 0.5% Marcaine, with epinephrine injected locally utilizing 50  mL. FINDINGS: None  SPECIMEN: None  IMPLANTS: * No implants in log *  ESTIMATED BLOOD LOSS: 25 ml  FLUIDS: 2900 ml   URINE OUTPUT: 200 ml   DRAIN: None  COMPLICATIONS: None  OPERATIVE START TIME: 1317  OPERATIVE COMPLETION TIME: 1535    DESCRIPTION OF PROCEDURE: The patient was prepped and draped in standard sterile fashion after being placed under general anesthetic. A site was selected superior to the umbilicus in the midline. This area was incised. A [employer] 5 mm Optical trocar was placed over the laparoscope and directed into the abdominal cavity using Optiview technique. Abdomen was insufflated to 15 mmHg and surveyed. There was no evidence of injury from the entry. Additional trocars were then placed. My assistant place one 5 mm trocar was placed in the anterior axillary line left upper quadrant approximately 3 fingerbreadths below the costal margin and another 12 mm trocar was placed in the lateral portion of the left rectus sheath approximately 3 fingerbreadths lateral to the umbilical trocar. I then placed another 12 mm trocar was placed approximately 4 fingerbreadths lateral to the umbilical trocar in the  lateral portion of right rectus sheath.  All were placed after incising with scalpel, all were placed using blunt dissecting technique. There was no evidence of injury from the entries. Instrument were placed in the abdominal cavity. The omentum and transverse colon were retracted superiorly, exposing ligament of Treitz. Small bowel was measured 40 cm distal to the ligament of Treitz, divided at this level with A 60 mm [place] stapler. Harmonic scalpel was used to divide the bowel and mesentery. Harmonic dissection was used to continue the division to the base of the mesentery, confirming preservation of blood flow to the small bowel above and below the staple line prior to firing. Then, from the distal staple line, the Dianna limb was measured 150 cm distal and an antimesenteric enterotomy was made. Another antimesenteric enterotomy was made just proximal to the proximal staple line of the biliopancreatic limb. Through these enterotomies, a 60 mm [place] stapler was placed into the bowel, clamped on the antimesenteric borders and fired to form a stapled side-to-side anastomosis. The remaining enterotomy was closed in a running inverting fashion with 3-0 Vicryl suture. The mesenteric defect was then closed with running 2-0 silk suture, extending on the bowel wall in a running Lembert fashion for second layer closure of the enterotomy. At this point, attention was directed to the transverse mesocolon. While my assistant exposed the bare area of the transverse mesentery above the ligament of Trietz. A site was selected just anterior to the ligament of Treitz. This area was incised, entering the lesser sac. The Dianna limb was then passed through the fenestration of the transverse mesocolon and into the lesser sac taking care not to twist on its mesentery. At this point, the omentum and transverse colon were retracted inferiorly.  The greater curvature of the stomach was then grasped and the gastrocolic ligament was retracted by my assistant and I then  dissected the ligament directly off of the wall of the stomach using the Harmonic scalpel to widely open the lesser sac. Adhesions between the posterior wall of the stomach and the retroperitoneum were taken down under direct vision to fully free the lesser sac. At this point, an incision was made near the xiphoid. Through this incision, a Lalitha retractor was placed through the abdominal wall beneath the left lateral segment of the liver and connected to a bedside retraction system allowing exposure of the esophageal hiatus. While my assistant retracted the stomach and lesser omentum inferiorly, I dissected the angle of His was  anterior to posterior down the left rae of the diaphragm using Harmonic scalpel to assist in eventual division of the gastric pouch and distal stomach remnant. The lesser curvature of the stomach was then examined. A site was selected just proximal to the crow's foot of the vagus nerve in this area. The gastrohepatic ligament was dissected away from the lesser curvature of the stomach directly on the wall of the stomach to enter the lesser sac. This fenestration was then widened using Bovie cautery over a 10 mm articulating esophageal retractor which had been placed by my assistant through the lesser sac and brought through the fenestration. Then, a 60 mm Biddle stapler was placed transversely across the stomach through this fenestration, clamped and then fired to begin the formation of the gastric pouch. Another stapler was placed near the crotch of the previous staple line and directed superiorly toward the angle of His, and clamped. Prior to firing, a 34-[place] orogastric Jeff tube was brought through the esophagus into the stomach so its tip was against the transverse staple line, its course lying along the lesser curvature of stomach. The stapler was snugged against it and then fired.  Then, a 60 mm Biddle stapler with Seam Guard reinforcement was placed in the crotch of the previous staple line and clamped and then fired. Then, one additional staple loads was placed from the crotch of previous staple line directed superiorly toward the angle of His until the gastric pouch was fully divided from the distal stomach remnant. Once fully divided, the staple lines were examined, noted to be hemostatic and viable. The lesser omentum was placed between the staple lines to prevent gastro-gastric fistulization. The tip of the gastric pouch placed in the lesser sac and the distal stomach remnant was retracted anteriorly to allow exposure of the lesser sac. The Dianna limb was examined. The proximal 6 cm were noted to be tethered by its mesentery. This proximal segment was elevated by my assistant and then I excised from the mesentery using a Harmonic scalpel and then one additional staple load was used to divide the devascularized segment from the remainder of the Dianna limb. This segment was brought out of the abdominal cavity via one of the trocar sites, passed off the field and discarded. At this point, the gastrojejunostomy was begun by sewing the right antimesenteric border of the Dianna limb to the distal portion of the gastric pouch using running suture of 3-0 Vicryl. An anterior gastrotomy and antimesenteric enterotomy were made. From the left apex of these 2 enterotomies, a 3-0 Vicryl suture was placed and run on the posterior surface in a running inverting fashion to the right apex, transitioned on the anterior surface and sewn approximately half way across the opening in an inverting fashion. Then, another 3-0 Vicryl suture was placed at the left apex and sewn to the previous suture in a running inverting fashion on the anterior surface. Prior to tying these sutures, the Jeff tube was brought across the anastomosis, then the sutures were tied, completing the inner layer.  Then, from the left apex another 3-0 Vicryl suture was placed and run to the right apex in a running Lembert fashion completing the anterior outer layer os the anastomosis, thereby completing the anastomosis. Once this was complete, the Jeff tube was removed from the patient. Then, working from within the lesser sac, the Tommy limb was sewn to the right anterior surface pf the transverse mesocolic defect with  3 interrupted sutures of 2-0 silk. Then, the left side of the tommy limb mesentery was closed to the left side of the tranverse mesocolic defect with a running suture of 2-0 silk while my assistant exposed the defects from within the lesser sac. The omentum and transverse colon were retracted superiorly. The base of the left side of the transverse mesocolic defect was exposed by my assistant and this was sewn to the base of the left side of the Tommy limb mesentery with a pursestring suture of 2-0 silk. The Tommy limb was then retracted to the left. The right-side of the  transverse mesocolic defect was exposed by my assistant and closed to the right side of the Tommy limb mesentery with another pursestring suture of 2-0 silk, then one additional interrupted suture was placed between the right lateral surface of the Tommy limb and the right lateral surface of the transverse mesocolic defect, completing the closure of the defect around the Tommy limb. Once this was complete, both mesenteric defects were examined and noted to be well closed. Both anastomoses were examined and noted to be hemostatic and viable without evidence of leak. The omentum and transverse colon were retracted inferiorly back to normal position. The gastric remnant was placed over the anastomosis, returning the anastomosis into the lesser sac. The Lalitha retractor was removed. At this point, the abdomen was desufflated via the remaining trocars. All trocars were then removed. The trocar sites were rendered hemostatic with Bovie cautery and then closed by my assistant with interrupted 4-0 Monocryl deep dermal sutures. Dermabond was applied as wound dressings.   The patient tolerated the procedure well.

## 2019-01-09 NOTE — INTERVAL H&P NOTE
H&P Update:  Kevin Figueroa was seen and examined. History and physical has been reviewed. The patient has been examined.  There have been no significant clinical changes since the completion of the originally dated History and Physical.    Signed By: Filiberto Talavera MD     January 9, 2019 12:13 PM

## 2019-01-09 NOTE — ANESTHESIA PREPROCEDURE EVALUATION
Anesthetic History No history of anesthetic complications Review of Systems / Medical History Patient summary reviewed and pertinent labs reviewed Pulmonary Within defined limits Neuro/Psych Within defined limits Cardiovascular Within defined limits Exercise tolerance: >4 METS 
  
GI/Hepatic/Renal 
Within defined limits Endo/Other Morbid obesity Other Findings Physical Exam 
 
Airway Mallampati: II 
TM Distance: 4 - 6 cm Neck ROM: normal range of motion Mouth opening: Normal 
 
 Cardiovascular Regular rate and rhythm,  S1 and S2 normal,  no murmur, click, rub, or gallop Rhythm: regular Rate: normal 
 
 
 
 Dental 
 
Dentition: Lower dentition intact and Upper dentition intact Pulmonary Breath sounds clear to auscultation Abdominal 
GI exam deferred Other Findings Anesthetic Plan ASA: 2 Anesthesia type: general 
 
 
 
 
Induction: Intravenous Anesthetic plan and risks discussed with: Patient

## 2019-01-09 NOTE — ANESTHESIA POSTPROCEDURE EVALUATION
Procedure(s): LAPAROSCOPIC tommy-en-y GASTRIC BYPASS. Anesthesia Post Evaluation Multimodal analgesia: multimodal analgesia used between 6 hours prior to anesthesia start to PACU discharge Patient location during evaluation: bedside Patient participation: complete - patient participated Level of consciousness: awake Pain management: adequate Airway patency: patent Anesthetic complications: no 
Cardiovascular status: stable Respiratory status: acceptable Hydration status: acceptable Post anesthesia nausea and vomiting:  controlled Visit Vitals BP (!) 145/91 (BP 1 Location: Right arm, BP Patient Position: At rest) Pulse 100 Temp 36.5 °C (97.7 °F) Resp 16 Ht 5' 11\" (1.803 m) Wt (!) 176 kg (388 lb) SpO2 95% BMI 54.12 kg/m²

## 2019-01-09 NOTE — PROGRESS NOTES
Received pt in bed alert and verbal able to make needs known. No complaint of pain or discomfort. Assessment completed. 5 lap sites to abdomen c/d/i. No distress noted. Oriented to room and call light system. Family at the bedside. Bedside and Verbal shift change report given to Davis Orellana RN (oncoming nurse) by Josue Nolan RN (offgoing nurse). Report included the following information SBAR, Kardex, Intake/Output and MAR.

## 2019-01-10 VITALS
OXYGEN SATURATION: 98 % | DIASTOLIC BLOOD PRESSURE: 81 MMHG | HEART RATE: 71 BPM | HEIGHT: 71 IN | BODY MASS INDEX: 44.1 KG/M2 | SYSTOLIC BLOOD PRESSURE: 139 MMHG | TEMPERATURE: 98.9 F | WEIGHT: 315 LBS | RESPIRATION RATE: 16 BRPM

## 2019-01-10 LAB
ANION GAP SERPL CALC-SCNC: 10 MMOL/L (ref 3–18)
BUN SERPL-MCNC: 10 MG/DL (ref 7–18)
BUN/CREAT SERPL: 14 (ref 12–20)
CALCIUM SERPL-MCNC: 8.9 MG/DL (ref 8.5–10.1)
CHLORIDE SERPL-SCNC: 105 MMOL/L (ref 100–108)
CO2 SERPL-SCNC: 24 MMOL/L (ref 21–32)
CREAT SERPL-MCNC: 0.74 MG/DL (ref 0.6–1.3)
ERYTHROCYTE [DISTWIDTH] IN BLOOD BY AUTOMATED COUNT: 13.7 % (ref 11.6–14.5)
GLUCOSE SERPL-MCNC: 87 MG/DL (ref 74–99)
HCT VFR BLD AUTO: 43.2 % (ref 36–48)
HGB BLD-MCNC: 14.4 G/DL (ref 13–16)
MCH RBC QN AUTO: 28.1 PG (ref 24–34)
MCHC RBC AUTO-ENTMCNC: 33.3 G/DL (ref 31–37)
MCV RBC AUTO: 84.2 FL (ref 74–97)
PLATELET # BLD AUTO: 386 K/UL (ref 135–420)
PMV BLD AUTO: 9.7 FL (ref 9.2–11.8)
POTASSIUM SERPL-SCNC: 4.3 MMOL/L (ref 3.5–5.5)
RBC # BLD AUTO: 5.13 M/UL (ref 4.7–5.5)
SODIUM SERPL-SCNC: 139 MMOL/L (ref 136–145)
WBC # BLD AUTO: 18.3 K/UL (ref 4.6–13.2)

## 2019-01-10 PROCEDURE — 74011250637 HC RX REV CODE- 250/637: Performed by: SURGERY

## 2019-01-10 PROCEDURE — 85027 COMPLETE CBC AUTOMATED: CPT

## 2019-01-10 PROCEDURE — C9113 INJ PANTOPRAZOLE SODIUM, VIA: HCPCS | Performed by: SURGERY

## 2019-01-10 PROCEDURE — 80048 BASIC METABOLIC PNL TOTAL CA: CPT

## 2019-01-10 PROCEDURE — 74011250636 HC RX REV CODE- 250/636: Performed by: SURGERY

## 2019-01-10 RX ORDER — ENOXAPARIN SODIUM 100 MG/ML
40 INJECTION SUBCUTANEOUS DAILY
Qty: 7 SYRINGE | Refills: 0 | Status: SHIPPED
Start: 2019-01-10 | End: 2019-01-24 | Stop reason: ALTCHOICE

## 2019-01-10 RX ORDER — ACETAMINOPHEN 325 MG/1
650 TABLET ORAL
Status: DISCONTINUED | OUTPATIENT
Start: 2019-01-10 | End: 2019-01-10 | Stop reason: HOSPADM

## 2019-01-10 RX ORDER — OXYCODONE HYDROCHLORIDE 5 MG/1
5 TABLET ORAL
Qty: 15 TAB | Refills: 0 | Status: SHIPPED | OUTPATIENT
Start: 2019-01-10 | End: 2019-01-24

## 2019-01-10 RX ORDER — HYDROMORPHONE HYDROCHLORIDE 1 MG/ML
1 INJECTION, SOLUTION INTRAMUSCULAR; INTRAVENOUS; SUBCUTANEOUS
Status: DISCONTINUED | OUTPATIENT
Start: 2019-01-10 | End: 2019-01-10 | Stop reason: HOSPADM

## 2019-01-10 RX ORDER — OXYCODONE HYDROCHLORIDE 5 MG/1
5 TABLET ORAL
Status: DISCONTINUED | OUTPATIENT
Start: 2019-01-10 | End: 2019-01-10 | Stop reason: HOSPADM

## 2019-01-10 RX ORDER — ONDANSETRON 4 MG/1
4 TABLET, ORALLY DISINTEGRATING ORAL
Qty: 10 TAB | Refills: 0 | Status: SHIPPED | OUTPATIENT
Start: 2019-01-10 | End: 2019-07-15 | Stop reason: ALTCHOICE

## 2019-01-10 RX ORDER — SODIUM CHLORIDE 0.9 % (FLUSH) 0.9 %
5-40 SYRINGE (ML) INJECTION EVERY 8 HOURS
Status: DISCONTINUED | OUTPATIENT
Start: 2019-01-10 | End: 2019-01-10 | Stop reason: HOSPADM

## 2019-01-10 RX ORDER — SODIUM CHLORIDE 0.9 % (FLUSH) 0.9 %
5-40 SYRINGE (ML) INJECTION AS NEEDED
Status: DISCONTINUED | OUTPATIENT
Start: 2019-01-10 | End: 2019-01-10 | Stop reason: HOSPADM

## 2019-01-10 RX ADMIN — ENOXAPARIN SODIUM 40 MG: 40 INJECTION, SOLUTION INTRAVENOUS; SUBCUTANEOUS at 09:35

## 2019-01-10 RX ADMIN — PANTOPRAZOLE SODIUM 40 MG: 40 INJECTION, POWDER, FOR SOLUTION INTRAVENOUS at 09:32

## 2019-01-10 RX ADMIN — KETOROLAC TROMETHAMINE 15 MG: 30 INJECTION, SOLUTION INTRAMUSCULAR at 05:39

## 2019-01-10 RX ADMIN — ACETAMINOPHEN 650 MG: 325 TABLET, FILM COATED ORAL at 11:39

## 2019-01-10 RX ADMIN — Medication 10 ML: at 09:32

## 2019-01-10 RX ADMIN — SODIUM CHLORIDE, SODIUM LACTATE, POTASSIUM CHLORIDE, AND CALCIUM CHLORIDE 150 ML/HR: 600; 310; 30; 20 INJECTION, SOLUTION INTRAVENOUS at 09:30

## 2019-01-10 RX ADMIN — ACETAMINOPHEN 650 MG: 325 TABLET, FILM COATED ORAL at 05:39

## 2019-01-10 NOTE — PROGRESS NOTES
9754- Bedside and Verbal shift change report given to Leyla Blair RN (oncoming nurse) by Mat Anglin RN (offgoing nurse). Report included the following information SBAR, Kardex and MAR.  nor pm RNs were unable to draw morning labs (CBC and BMP). Anesthesia contacted for assistance. 1801 [facility] ELIESER Reyes and Dr. Aneesh almonte on pt.    0800- Received call from cath lab. PICC nurse will come assist drawing labs on pt. MD aware. 1216- Provided teaching with demonstration to pt and mother (caregiver) on Lovenox administration. Both pt and mother verbalized understanding. Opportunity for questions provided. 1105 Ubaldo Salgado RN (PICC RN) able to obtain blood work from left [facility] PIV. Blood work sent to lab. 1130- Pt ambulated multiple times in hallway and met goal.      1135- Pt discharged home.

## 2019-01-10 NOTE — ROUTINE PROCESS
Patient arrived from pacu on bariatric bed alert and oriented. Lap sites to abdomen c/d/i  Ice pack applied to abdomen. scd intact. triflow demonstrated. Family at bedside.   Patient tolerating ice chips

## 2019-01-10 NOTE — PERIOP NOTES
Recd care of pt from OR via bed. Resp even and unlabored. Attached to monitor. VSS. OR, MAR and anesthesia report acknowledged. Will cont to monitor. 1630  TRANSFER - OUT REPORT:    Verbal report given to Isabelle Bloch, RN (name) on Shruti Knox  being transferred to 2200 (unit) for routine post - op       Report consisted of patients Situation, Background, Assessment and   Recommendations(SBAR). Information from the following report(s) SBAR, Kardex, OR Summary, Intake/Output, MAR and Cardiac Rhythm sinus rhythm was reviewed with the receiving nurse. Lines:   Peripheral IV 01/09/19 Left Antecubital (Active)   Site Assessment Clean, dry, & intact 1/9/2019 10:00 AM   Phlebitis Assessment 0 1/9/2019 10:00 AM   Dressing Status Clean, dry, & intact 1/9/2019 10:00 AM   Dressing Type Tape;Transparent 1/9/2019 10:00 AM   Hub Color/Line Status Blue; Infusing 1/9/2019 10:00 AM       Peripheral IV Right Hand (Active)        Opportunity for questions and clarification was provided.       Patient transported with:   [email or web address]

## 2019-01-10 NOTE — DISCHARGE INSTRUCTIONS
DISCHARGE SUMMARY from Nurse    PATIENT INSTRUCTIONS:    After general anesthesia or intravenous sedation, for 24 hours or while taking prescription Narcotics:  · Limit your activities  · Do not drive and operate hazardous machinery  · Do not make important personal or business decisions  · Do  not drink alcoholic beverages  · If you have not urinated within 8 hours after discharge, please contact your surgeon on call. Report the following to your surgeon:  · Excessive pain, swelling, redness or odor of or around the surgical area  · Temperature over 100.5  · Nausea and vomiting lasting longer than 4 hours or if unable to take medications  · Any signs of decreased circulation or nerve impairment to extremity: change in color, persistent  numbness, tingling, coldness or increase pain  · Any questions    What to do at Home:  Recommended activity: Activity as tolerated, see surgeon's instructions. If you experience any of the symptoms above, please follow up with . *  Please give a list of your current medications to your Primary Care Provider. *  Please update this list whenever your medications are discontinued, doses are      changed, or new medications (including over-the-counter products) are added. *  Please carry medication information at all times in case of emergency situations. These are general instructions for a healthy lifestyle:    No smoking/ No tobacco products/ Avoid exposure to second hand smoke  Surgeon General's Warning:  Quitting smoking now greatly reduces serious risk to your health.     Obesity, smoking, and sedentary lifestyle greatly increases your risk for illness    A healthy diet, regular physical exercise & weight monitoring are important for maintaining a healthy lifestyle    You may be retaining fluid if you have a history of heart failure or if you experience any of the following symptoms:  Weight gain of 3 pounds or more overnight or 5 pounds in a week, increased swelling in our hands or feet or shortness of breath while lying flat in bed. Please call your doctor as soon as you notice any of these symptoms; do not wait until your next office visit. Recognize signs and symptoms of STROKE:    F-face looks uneven    A-arms unable to move or move unevenly    S-speech slurred or non-existent    T-time-call 911 as soon as signs and symptoms begin-DO NOT go       Back to bed or wait to see if you get better-TIME IS BRAIN. Warning Signs of HEART ATTACK     Call 911 if you have these symptoms:   Chest discomfort. Most heart attacks involve discomfort in the center of the chest that lasts more than a few minutes, or that goes away and comes back. It can feel like uncomfortable pressure, squeezing, fullness, or pain.  Discomfort in other areas of the upper body. Symptoms can include pain or discomfort in one or both arms, the back, neck, jaw, or stomach.  Shortness of breath with or without chest discomfort.  Other signs may include breaking out in a cold sweat, nausea, or lightheadedness. Don't wait more than five minutes to call 911 - MINUTES MATTER! Fast action can save your life. Calling 911 is almost always the fastest way to get lifesaving treatment. Emergency Medical Services staff can begin treatment when they arrive -- up to an hour sooner than if someone gets to the hospital by car. The discharge information has been reviewed with the patient. The patient verbalized understanding. Discharge medications reviewed with the patient and appropriate educational materials and side effects teaching were provided. Patient armband removed and shredded.   ___________________________________________________________________________________________________________________________________

## 2019-01-10 NOTE — PROGRESS NOTES
Surgery Progress Note    1/10/2019    Admit Date: 1/9/2019    Subjective:     Patient has complaints of none. Some abdominal pain he attributes to gas overnight, now resolved. Pain is controlled with current regimen. Patient has been ambulating in halls. He reports no nausea and no vomiting and is tolerating ice chips well. Objective:     Blood pressure 139/81, pulse 71, temperature 98.9 °F (37.2 °C), resp. rate 16, height 5' 11\" (1.803 m), weight (!) 176 kg (388 lb), SpO2 98 %. No intake/output data recorded. 01/08 1901 - 01/10 0700  In: 3215 [P.O.:180; I.V.:3035]  Out: 875 [Urine:850]    EXAM: GENERAL: alert, pleasant, no distress   HEART: regular rate and rhythm   LUNGS: clear to auscultation   ABDOMEN:  Soft, obese, appropriately tender, nondistended, incisions clean, dry, no erythema or drainage   EXTREMITIES: warm, well perfused    Data Review  No results found for this or any previous visit (from the past 24 hour(s)). AM LABS PENDING. Assessment:   Jamie Terry is a 26 y.o. male, postop day 1 status post laparoscopic gastric bypass surgery. Condition: good    Plan:   -Check am labs.   - Ambulate every four hours  -Oxycodone 5mg 1-2 tabs po every 4-6 hour prn pain uncontrolled by tylenol  -Advance to Clear liquid Gastric Bypass Diet, if able to tolerate clear liquid diet 4oz per hour one of which being a protein supplement, will discharge home later today      Rylee Polanco MS,  PASINA

## 2019-01-10 NOTE — PROGRESS NOTES
Patient was educated on progression of diet this AM. Goal of 4 ounces per hour with one ounce being protein was clearly understood. Patient was instructed to go slow with small sips to reach goal. Patient given a report card to record intake. Education completed on I.S use and to ambulate in nino at least 4 times. Will follow up and check progression.

## 2019-01-10 NOTE — PROGRESS NOTES
Bedside shift change report given to Hanny Ames RN (oncoming nurse) by Antony Sena RN (offgoing nurse). Report included the following information SBAR, Kardex and MAR.     2008: Patient bladder scanned because p[patient did not meet due to void goal of 2133.     2015: Bladder scan shown to have 246 ml of urine in bladder. Patient encouraged to use urinal and try to pass his urine. Patient expressed increased pain and burning feeling when trying to void. Patient rates pain 9/10 when trying to void. Will continue to monitor. 2030: Patient was able to void 200ml of urine, patient did express that there was some discomfort while voiding. Will continue to monitor. 2045: Patient ambulated in nino for serveral laps and tolerated the activity well. Will continue to monitor. 2200: Patient ambulated in nino for serveral laps and tolerated the activity well. Will continue to monitor. 0015: Patient ambulated in nino for serveral laps and tolerated the activity well. Will continue to monitor. 0210: Patient ambulated in nino for serveral laps and tolerated the activity well. Will continue to monitor. 0300:  reported that she was unable to obtain the patients labs. I attempted and was unsuccessful. Nursing supervisor notified and RN being sent to get labs. 0430: Patient ambulated in nino for serveral laps and tolerated the activity well. Will continue to monitor. Patient is taking in ice chips and sips well. Mother is at bedside and has thrown away several of the cups used for measuring the patients intake. Patient and mother educated to allow staff to measure intake before discarding the cups.     0600: Patient sitting up in chair and mother at bedside. Bedside shift change report given to Bernardo Mays RN (oncoming nurse) by Hanny Ames RN (offgoing nurse).  Report included the following information SBAR, Kardex, MAR.

## 2019-01-10 NOTE — PROGRESS NOTES
Care Management Interventions  PCP Verified by CM: Yes  Palliative Care Criteria Met (RRAT>21 & CHF Dx)?: No  Transition of Care Consult (CM Consult): Discharge Planning  Physical Therapy Consult: No  Occupational Therapy Consult: No  Speech Therapy Consult: No  Current Support Network: Other(parents)  Confirm Follow Up Transport: Family  Plan discussed with Pt/Family/Caregiver: Yes  Discharge Location  Discharge Placement: Home     Reason for Admission:   Gastric Bypass                       RRAT Score:  green                   Plan for utilizing home health: if recommended                           Likelihood of Readmission:  green                         Transition of Care Plan:     Spoke with patient in room, he stated that he lives with his parents, He is independent with his care and uses no DME's. He verified his address, PCP, insurance and phone # as correct on the facesheet. Patient has designated __mother______________________ to participate in his/her discharge plan and to receive any needed information. María Elena Norris [phone]. He plans to return home upon discharge and transportation will be provided by family.

## 2019-01-10 NOTE — PROGRESS NOTES
Spoke with supervisor regarding AM labs for this patient. 0745: Spoke with donald Vega nurse, on-call PICC nurse will be in this AM to draw labs for patient.

## 2019-01-10 NOTE — PROGRESS NOTES
conducted an initial consultation and Spiritual Assessment for Ricki Dykes, who is a 26 y. o.,male. Patients Primary Language is: [place]. According to the patients EMR [employer] Affiliation is: [facility].     The reason the Patient came to the hospital is:   Patient Active Problem List    Diagnosis Date Noted    BMI 50.0-59.9, adult ([address] 12/31/2018    Low vitamin D level 12/31/2018    Morbid obesity ([facility] 07/20/2018    BMI 60.0-69.9, adult ([facility] 07/20/2018    Arthropathy 07/20/2018        The  provided the following Interventions:  Initiated a relationship of care and support. Provided information about Spiritual Care Services. Offered prayer and assurance of continued prayers on patient's behalf. The following outcomes were achieved:  Patient expressed gratitude for 's visit. Assessment:  There are no further spiritual or Samaritan issues which require intervention at this time. Plan:  Chaplains will continue to follow and will provide pastoral care on an as needed/requested basis. Brijesh Jonas M.Div.   , [address] [address] [address]: [phone]/PFQ: [phone]

## 2019-01-10 NOTE — PROGRESS NOTES
Problem: Falls - Risk of  Goal: *Absence of Falls  Document Nicole Fall Risk and appropriate interventions in the flowsheet.   Outcome: Progressing Towards Goal  Fall Risk Interventions:            Medication Interventions: Patient to call before getting OOB    Elimination Interventions: Call light in reach, Urinal in reach

## 2019-01-14 NOTE — DISCHARGE SUMMARY
Bariatric Surgery Discharge Progress Note    Admission Date: 1/9/2019    Discharge Date: 1/10/2019      Admission Diagnosis:    Clinically severe Obesity    Comorbidities:  Multi-joint arthropathy, weight related    Discharge Diagnosis:     Clinically Severe Obesity, s/p laparoscopic Dianna-en-Y divided gastric bypass with comorbidities as listed above    Procedures:   Laparoscopic Dianna-en-Y divided gastric bypass    Postop Complications: None    Hospital Course:  Patient was admitted on 1/9/2019 for scheduled bariatric surgery. Operation was without significant complication. Patient admitted to the floor postoperatively, monitored as per protocol. Diet sequentially advanced beginning POD 1, pain medications transitioned to oral during the hospital course. At the time of discharge, the patient is afebrile, vital signs stable, tolerating a clear liquid diet with protein supplementation, voiding spontaneously, ambulatory with adequate pain control with oral medications and clear surgical sites without evidence of infection.     Discharge Diet:  Clear Liquid Bariatric Diet for 7 days, then soft moist protein diet for 5 weeks    Discharge Medications:   *All medications as per Medical Reconciliation Form\"    Bariatric Chewable vitamins, 2 orally daily for life  Calcium Citrate 2000mg orally daily for life  Vitamin B12 1000micrograms sublingual daily for life  Oxycodone 5mg tab 1-2 by mouth every 4-6 hours as needed for pain uncontrolled with Tylenol  Enoxaparin (Lovenox) 40mg sub-Q daily for 7 days    Discharge disposition: home    Local wound care with daily showers, keep wounds clean and dry    Activity: as desired, no lifting greater than 15lbs or situps for 30 days    Special Instructions:   No driving until activity is not influenced by incisional pain and off narcotics   No bath or hot tub until wounds are healed   Pulse and temperature twice daily for 10 days   Notify [employer] for a Temp >100.5 or Pulse>115    Followup with surgeon in 2 weeks    Hola Hammer MS, PA-C

## 2019-01-24 ENCOUNTER — OFFICE VISIT (OUTPATIENT)
Dept: SURGERY | Age: 24
End: 2019-01-24

## 2019-01-24 VITALS
HEART RATE: 89 BPM | RESPIRATION RATE: 18 BRPM | HEIGHT: 71 IN | TEMPERATURE: 98.3 F | OXYGEN SATURATION: 99 % | DIASTOLIC BLOOD PRESSURE: 78 MMHG | WEIGHT: 315 LBS | SYSTOLIC BLOOD PRESSURE: 110 MMHG | BODY MASS INDEX: 44.1 KG/M2

## 2019-01-24 DIAGNOSIS — K91.2 POSTOPERATIVE MALABSORPTION: Primary | ICD-10-CM

## 2019-01-24 RX ORDER — MAGNESIUM 200 MG
1000 TABLET ORAL DAILY
COMMUNITY
End: 2019-02-21 | Stop reason: CLARIF

## 2019-01-24 RX ORDER — BUTALB/ACETAMINOPHEN/CAFFEINE 50-325-40
TABLET ORAL
COMMUNITY
End: 2019-07-15

## 2019-01-24 NOTE — PROGRESS NOTES
Subjective:      Sarah Meyers is a 26 y.o. male is now 2 weeks status post laparoscopic gastric bypass surgery. Doing well overall. Currently on a stage 3 diet without difficulty. Taking in 60oz water,  60g protein. 45min of activity daily. Bowel movements are regular. The patient is not having any pain. .  The patient is compliant with multivitamins, calcium and B12 supplements. Weight Loss Metrics 1/24/2019 1/24/2019 1/9/2019 1/4/2019 12/20/2018 12/20/2018 11/6/2018   Pre op / Initial Wt 388 - - - 391.8 - 431   Today's Wt - 357 lb 12.8 oz - 388 lb - 391 lb 12.8 oz -   BMI - 49.9 kg/m2 54.12 kg/m2 - - 54.65 kg/m2 -   Ideal Body Wt 160 - - - 160 - 160   Excess Body Wt 228 - - - 231.8 - 271   Goal Wt 206 - - - 206 - 214   Wt loss to date 30.2 - - - 0 - 27   % Wt Loss 0.17 - - - 0 - 0.12   80% .4 - - - 185.44 - 216.8       Body mass index is 49.9 kg/m². 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  calcium citrate-vitamin d2 1,500-200 mg-unit tab Take  by mouth.  cyanocobalamin (VITAMIN B-12) 1,000 mcg sublingual tablet Take 1,000 mcg by mouth daily.  ondansetron (ZOFRAN ODT) 4 mg disintegrating tablet Take 1 Tab by mouth every eight (8) hours as needed for Nausea.  10 Tab 0    cholecalciferol (VITAMIN D3) 1,000 unit tablet Take 5,000 Units by mouth daily. Allergies   Allergen Reactions    Pcn [Penicillins] Hives         Objective:     Visit Vitals  /78   Pulse 89   Temp 98.3 °F (36.8 °C)   Resp 18   Ht 5' 11\" (1.803 m)   Wt (!) 162.3 kg (357 lb 12.8 oz)   SpO2 99%   BMI 49.90 kg/m²       General:  alert, cooperative, no distress, appears stated age   Chest: no accessory muscle use   Cor:   Regular rate and rhythm   Abdomen: soft, bowel sounds active, non-tender   Incision:   healing well, no drainage, no erythema, no hernia, no seroma, no swelling, no dehiscence, incision well approximated       Labs: none    Assessment:     Doing well postoperatively.     Plan:     Increase activity to the goal of 30 minutes daily, Okay to proceed with [facility], Follow up labs as ordered, Increase fluids, Follow up with Registered Dietician and Continue MVI/Ca/B12 supplementation  Follow up in 3 months

## 2019-01-24 NOTE — PROGRESS NOTES
Chief Complaint   Patient presents with    Post OP Follow Up     LGBP 1/9/2019     Pt ID confirmed    Weight Loss Metrics 1/24/2019 1/24/2019 1/9/2019 1/4/2019 12/20/2018 12/20/2018 11/6/2018   Pre op / Initial Wt 388 - - - 391.8 - 431   Today's Wt - 357 lb 12.8 oz - 388 lb - 391 lb 12.8 oz -   BMI - 49.9 kg/m2 54.12 kg/m2 - - 54.65 kg/m2 -   Ideal Body Wt 160 - - - 160 - 160   Excess Body Wt 228 - - - 231.8 - 271   Goal Wt 206 - - - 206 - 214   Wt loss to date 30.2 - - - 0 - 27   % Wt Loss 0.17 - - - 0 - 0.12   80% .4 - - - 185.44 - 216.8       Body mass index is 49.9 kg/m².     Post op medications:  MVI: flintstones twice daily  Calcium Citrate: 1500 milligrams  Actigal N/A  B-12 1000 micrograms  Vit D 3 5000 units

## 2019-02-13 ENCOUNTER — HOSPITAL ENCOUNTER (OUTPATIENT)
Dept: BARIATRICS/WEIGHT MGMT | Age: 24
Discharge: HOME | End: 2019-02-13

## 2019-02-13 ENCOUNTER — DOCUMENTATION ONLY (OUTPATIENT)
Dept: BARIATRICS/WEIGHT MGMT | Age: 24
End: 2019-02-13

## 2019-02-13 NOTE — PROGRESS NOTES
[facility]  POST-OP NUTRITION FOLLOW UP    Patient's Name: Humberto Murray  YOB: 1995  Surgery Date: 19      Procedure: Gastric Bypass    Surgeon: Dr. Solange Winters    Height:  5 f 11     Pre-Op Weight: 388     Current Weight: 338  Weight Lost: 50    BMI:  47    Attendance of support group:   When:   Why not:     Complications  Readmittance: None  Reoperations: None  Complications: Dehydration  IV Fluids: Yes, 3 bags of this. Patient received a banana bag with thiamine in it in [facility]: Yes. 1 week ago    Problem Areas:   Nausea:  Not necessarily with food, but in general.  He states he woke up nauseated    Vomiting: More dry heavy, but not necessarily with food   Dumping Syndrome: None  Inadequate Protein: Yes. Patient does not like the shake. Inadequate Fluids: Yes. Poor fluid intake  Food Intolerance: None  Hunger: Sometimes  Constipation:  None    Eating 3 Meals/Day: 1-2  Portion Size at Meals: 2 ounces     Protein from Food:     Foods being consumed:  He states he doesn't have a lot of energy. He was eating at 11 am, but is now eating at 9 am.  Breakfast: Time: 9 am:Chicken thigh. Lunch: Time:  Not eating. We talked about times that could work     Dinner:  Time: Not always eating     In-between eating:     Length of time for meals:     food/fluids: Yes    Fluids: 32 oz/day   Types of Fluids: Water     MVI: Flintstones    Number/Day: Not routinely taking   Taken Separately:     Calcium: Not taking    Calcium Dosing:     Taken Separately:     Vitamin B12: Sublingual   Vitamin B12 Dosin mcg    Vitamin D: Yes     Vitamin D dosin-5000 IU    Iron: n/a    Iron dosing:      Protein Supplement: Not taking     Grams of Protein:    Mixed with:      Splitting Protein Drink in 1/2:    Timing of Protein Drinks:   Patient is taking 0 days a week.     Exercise: Not doing anything      Comments:    Patient came in today for his 6 week post op nutrition appointment. He was accompanied by his mom and dad. Patient appeared to be drained and looked pale. He stated he went to the ER in [facility] last week and got IV fluids and a banana bag. He stated he was given thiamine shots (which Dr. Ngoc Mcdonald had discussed with ER staff). Patient states he is doing very poorly with his fluid, only getting about 32 ounces in. He has a lot of reasons why he wasn't drinking more, such as his sleep schedule is off, so he is sleeping later, and not drinking. He did not complain of pain or discomfort when drinking, but he was burping a lot in class, leading me to think he is taking too big of swallows and swallowing air. Patient is not taking protein shakes stating he does not like them. I provided him with samples of Thomasena Alex and made recommendations of shakes that may be more tolerable. He is eating 1-2 x a time on soft protein, but I think he has developed a food aversion stating he will often start gagging before it even touches his mouth. He is non-compliant with the vitamins. He is not taking his MVI regularly, he is not taking his calcium at all. Patient is taking b12 and Vitamin D. He was instructed at ER to buy OTC thiamine, which he has not done. I encouraged him to purchased BA B1 while he was here, he did not. I gave him samples of calcium chews, which he tried in the office, but then stated the weren't very good tasting. I spent a lot of time talking with patient about these habits. I made suggestions for him to increase his fluid intake and trackers to help with this. We talked about the bariatric maintenance diet and he was instructed to set an alarm 3 x to eat. I spent a great amount of time talking to him about the importance of the vitamins and the risks that can occur if he doesn't take them. Patient seemed overall non receptive to what I was saying. Patient did very well pre-op having lost 43 pounds.   I have reminded him that he needs to start making the diet changes and behavior changes that were explained pre op. I am very concerned about his hydration and overall nutritional status. Patient currently has an appointment in April for his 3 month follow up and I set him up to see me in 1 month again, but I feel he may need to see the provider soon. Will coordinate my visit with provider. Patient was educated on the importance of eating meat and vegetables only. I have talked with patient about the effects of carbohydrates, not only from a weight management perspective, but also what effects it could have on their blood sugar and what reactive hypoglycemia is.         Diet Follow Up:  March 27     Carolina House 87 RD    2/13/2019

## 2019-02-19 ENCOUNTER — DOCUMENTATION ONLY (OUTPATIENT)
Dept: BARIATRICS/WEIGHT MGMT | Age: 24
End: 2019-02-19

## 2019-02-19 NOTE — PROGRESS NOTES
2/19/19:  Patient was left a voicemail asking to call me, so we can schedule a follow up with provider ASAP. Patient is ~  7 weeks post op and non compliant with all protocol. Please refer to bottom of my nutrition note for more detailed information.     Michael Angel MS RD

## 2019-02-20 ENCOUNTER — OFFICE VISIT (OUTPATIENT)
Dept: SURGERY | Age: 24
End: 2019-02-20

## 2019-02-20 VITALS
HEIGHT: 71 IN | HEART RATE: 92 BPM | OXYGEN SATURATION: 97 % | TEMPERATURE: 99.4 F | WEIGHT: 315 LBS | BODY MASS INDEX: 44.1 KG/M2

## 2019-02-20 DIAGNOSIS — Z91.199 NON-COMPLIANCE: ICD-10-CM

## 2019-02-20 DIAGNOSIS — R53.83 FATIGUE, UNSPECIFIED TYPE: ICD-10-CM

## 2019-02-20 DIAGNOSIS — E51.9 MANIFESTATIONS OF THIAMINE DEFICIENCY: ICD-10-CM

## 2019-02-20 DIAGNOSIS — Z98.84 NONCOMPLIANCE WITH DIETARY REGIMEN REQUIRED STATUS POST BARIATRIC SURGERY: ICD-10-CM

## 2019-02-20 DIAGNOSIS — E86.0 DEHYDRATION: ICD-10-CM

## 2019-02-20 DIAGNOSIS — K91.2 POSTOPERATIVE MALABSORPTION: ICD-10-CM

## 2019-02-20 DIAGNOSIS — Z91.119 NONCOMPLIANCE WITH DIETARY REGIMEN REQUIRED STATUS POST BARIATRIC SURGERY: ICD-10-CM

## 2019-02-20 DIAGNOSIS — R11.2 NAUSEA AND VOMITING, INTRACTABILITY OF VOMITING NOT SPECIFIED, UNSPECIFIED VOMITING TYPE: ICD-10-CM

## 2019-02-20 DIAGNOSIS — E66.01 MORBID (SEVERE) OBESITY DUE TO EXCESS CALORIES (HCC): Primary | ICD-10-CM

## 2019-02-20 NOTE — PROGRESS NOTES
Aurelio Lewis is a 26 y.o. male is now 6 weeks status post laparoscopic gastric bypass surgery. Currently on a stage 3 diet but transitioning to stage 4 with difficulty. Taking in 30-48 oz water,  Unknown g protein. Bowel movements are regular. The patient is not having any pain. Raul Saunas He is not compliant with multivitamins, calcium, Vit D and B12 supplements. Fluids: 30-48 oz       Weight Loss Metrics 2/20/2019 2/20/2019 1/24/2019 1/24/2019 1/9/2019 1/4/2019 12/20/2018   Pre op / Initial Wt 391 - 388 - - - 391.8   Today's Wt - 334 lb - 357 lb 12.8 oz - 388 lb -   BMI - 46.58 kg/m2 - 49.9 kg/m2 54.12 kg/m2 - -   Ideal Body Wt 160 - 160 - - - 160   Excess Body Wt 231 - 228 - - - 231.8   Goal Wt 206 - 206 - - - 206   Wt loss to date 57 - 30.2 - - - 0   % Wt Loss 0.31 - 0.17 - - - 0   80% .8 - 182.4 - - - 185.44       Body mass index is 46.58 kg/m². 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  calcium citrate-vitamin d2 1,500-200 mg-unit tab Take  by mouth.  cyanocobalamin (VITAMIN B-12) 1,000 mcg sublingual tablet Take 1,000 mcg by mouth daily.  ondansetron (ZOFRAN ODT) 4 mg disintegrating tablet Take 1 Tab by mouth every eight (8) hours as needed for Nausea. 10 Tab 0    cholecalciferol (VITAMIN D3) 1,000 unit tablet Take 5,000 Units by mouth daily. Allergies   Allergen Reactions    Pcn [Penicillins] Hives       ROS:  Review of Systems   Constitutional: Positive for malaise/fatigue and weight loss. Some food avoidance but seems to be improving over past 2 days    HENT: Negative.          Dry mouth    Respiratory: Positive for cough and shortness of breath. Negative for wheezing. Cardiovascular: Negative. Negative for chest pain and palpitations. Gastrointestinal: Positive for nausea and vomiting. Negative for abdominal pain, constipation, diarrhea and heartburn. Frothing/ foaming in am   Gagging in am and with some foods      Neurological: Positive for weakness. Psychiatric/Behavioral: The patient has insomnia. Poor sleep routine since surgery   All other systems reviewed and are negative. Physicial Exam:  Visit Vitals  Pulse 92   Temp 99.4 °F (37.4 °C)   Ht 5' 11\" (1.803 m)   Wt 151.5 kg (334 lb)   SpO2 97%   BMI 46.58 kg/m²     Physical Exam   Constitutional: He is oriented to person, place, and time. He appears lethargic and dehydrated. HENT:   Head: Normocephalic and atraumatic. Mouth/Throat: Mucous membranes are pale and dry. Cardiovascular: Normal rate. Pulmonary/Chest: Effort normal.   Abdominal: Soft. He exhibits no distension. There is no tenderness. Musculoskeletal: Normal range of motion. Neurological: He is oriented to person, place, and time. He appears lethargic. Skin: Skin is dry. There is pallor. Psychiatric: Affect and judgment normal.   Nursing note and vitals reviewed. Labs: Pending    Assessment/Plan: Pt is currently 6 weeks s/p laparoscopic gastric bypass surgery with a total weight loss of 57 Lbs to date, struggling with eating, hydration, program compliance. Labs were ordered and pt was instructed to have labs performed. Stressed importance of hydration with SF clear liquids until urine clear. Fluids ordered to the outpatient infusion center along with banana bag as patient has been noncompliant with all of his vitamins. After consent was received, intramuscular B1 and B12 injections given in office, patient tolerated well. Vitamins and supplements reviewed in great detail along with doses frequency and various options. Patient is at high risk for dehydration, vitamin deficiency, and malnutrition. This was discussed at great lengths with the patient as well as his mother, they both verbally communicate understanding of this and the recommendations we reviewed to prevent/avoid these. Additionally reviewed recommendations for supportive environment with mother. Continue to advance diet per registered dietitian, reviewed stages of diet with patient and mother at visit, confirmed with dietitian the appropriate proteins as well as vegetables that the patient can consume per pt request, and reviewed these with the patient/mother. Patient communicates understanding of his nutritional recommendations. Upper GI ordered to rule out anatomical abnormality. Labs ordered to be performed at outpatient infusion center, additionally recheck in 1 month.      Follow up 1 month Dr Brandy Collet   Follow up Dr Susie Sorto, NP

## 2019-02-20 NOTE — LETTER
Please find the attached note. I recommended that this patient seek follow-up with you as soon as possible he is 6 weeks postoperative laparoscopic gastric bypass and is already struggling with avoiding foods and overall compliance.   
 
Thanks, 
 Jelena Sotomayor 148

## 2019-02-20 NOTE — Clinical Note
These call patient and let him know I have additionally ordered labs to be done prior to his 1 month follow-up with Dr. Teresita Edmond

## 2019-02-20 NOTE — PROGRESS NOTES
Pt ID confirmed    Weight Loss Metrics 2/20/2019 2/20/2019 1/24/2019 1/24/2019 1/9/2019 1/4/2019 12/20/2018   Pre op / Initial Wt 391 - 388 - - - 391.8   Today's Wt - 334 lb - 357 lb 12.8 oz - 388 lb -   BMI - 46.58 kg/m2 - 49.9 kg/m2 54.12 kg/m2 - -   Ideal Body Wt 160 - 160 - - - 160   Excess Body Wt 231 - 228 - - - 231.8   Goal Wt 206 - 206 - - - 206   Wt loss to date 57 - 30.2 - - - 0   % Wt Loss 0.31 - 0.17 - - - 0   80% .8 - 182.4 - - - 185.44       Body mass index is 46.58 kg/m².     Post op medications:  MVI: not taking  Calcium Citrate: not taking  Actigal 0  B-12 not taking  Vit D 3 not taking

## 2019-02-21 ENCOUNTER — HOSPITAL ENCOUNTER (OUTPATIENT)
Dept: INFUSION THERAPY | Age: 24
Discharge: HOME | End: 2019-02-21
Payer: COMMERCIAL

## 2019-02-21 VITALS
SYSTOLIC BLOOD PRESSURE: 118 MMHG | OXYGEN SATURATION: 98 % | TEMPERATURE: 98.8 F | RESPIRATION RATE: 18 BRPM | HEART RATE: 71 BPM | DIASTOLIC BLOOD PRESSURE: 85 MMHG

## 2019-02-21 LAB
ALBUMIN SERPL-MCNC: 4.1 G/DL (ref 3.4–5)
ALBUMIN/GLOB SERPL: 1.5 {RATIO} (ref 0.8–1.7)
ALP SERPL-CCNC: 117 U/L (ref 45–117)
ALT SERPL-CCNC: 75 U/L (ref 16–61)
ANION GAP SERPL CALC-SCNC: 17 MMOL/L (ref 3–18)
AST SERPL-CCNC: 44 U/L (ref 15–37)
BASO+EOS+MONOS # BLD AUTO: 1.2 K/UL (ref 0–2.3)
BASO+EOS+MONOS NFR BLD AUTO: 20 % (ref 0.1–17)
BILIRUB SERPL-MCNC: 0.8 MG/DL (ref 0.2–1)
BUN SERPL-MCNC: 8 MG/DL (ref 7–18)
BUN/CREAT SERPL: 7 (ref 12–20)
CALCIUM SERPL-MCNC: 9.3 MG/DL (ref 8.5–10.1)
CHLORIDE SERPL-SCNC: 101 MMOL/L (ref 100–108)
CO2 SERPL-SCNC: 24 MMOL/L (ref 21–32)
CREAT SERPL-MCNC: 1.13 MG/DL (ref 0.6–1.3)
DIFFERENTIAL METHOD BLD: ABNORMAL
ERYTHROCYTE [DISTWIDTH] IN BLOOD BY AUTOMATED COUNT: 16 % (ref 11.5–14.5)
GLOBULIN SER CALC-MCNC: 2.8 G/DL (ref 2–4)
GLUCOSE SERPL-MCNC: 70 MG/DL (ref 74–99)
HCT VFR BLD AUTO: 49.1 % (ref 36–48)
HGB BLD-MCNC: 16.6 G/DL (ref 12–16)
LYMPHOCYTES # BLD: 1.6 K/UL (ref 1.1–5.9)
LYMPHOCYTES NFR BLD: 28 % (ref 14–44)
MCH RBC QN AUTO: 27.8 PG (ref 25–35)
MCHC RBC AUTO-ENTMCNC: 33.8 G/DL (ref 31–37)
MCV RBC AUTO: 82.1 FL (ref 78–102)
NEUTS SEG # BLD: 3 K/UL (ref 1.8–9.5)
NEUTS SEG NFR BLD: 52 % (ref 40–70)
PLATELET # BLD AUTO: 267 K/UL (ref 140–440)
POTASSIUM SERPL-SCNC: 4.1 MMOL/L (ref 3.5–5.5)
PROT SERPL-MCNC: 6.9 G/DL (ref 6.4–8.2)
RBC # BLD AUTO: 5.98 M/UL (ref 4.1–5.1)
SODIUM SERPL-SCNC: 142 MMOL/L (ref 136–145)
WBC # BLD AUTO: 5.8 K/UL (ref 4.5–13)

## 2019-02-21 PROCEDURE — 74011000250 HC RX REV CODE- 250: Performed by: NURSE PRACTITIONER

## 2019-02-21 PROCEDURE — 74011250636 HC RX REV CODE- 250/636: Performed by: NURSE PRACTITIONER

## 2019-02-21 PROCEDURE — 80053 COMPREHEN METABOLIC PANEL: CPT

## 2019-02-21 PROCEDURE — 96360 HYDRATION IV INFUSION INIT: CPT

## 2019-02-21 PROCEDURE — 96366 THER/PROPH/DIAG IV INF ADDON: CPT

## 2019-02-21 PROCEDURE — 96361 HYDRATE IV INFUSION ADD-ON: CPT

## 2019-02-21 PROCEDURE — 96368 THER/DIAG CONCURRENT INF: CPT

## 2019-02-21 PROCEDURE — 96365 THER/PROPH/DIAG IV INF INIT: CPT

## 2019-02-21 PROCEDURE — 85025 COMPLETE CBC W/AUTO DIFF WBC: CPT

## 2019-02-21 RX ORDER — SODIUM CHLORIDE 0.9 % (FLUSH) 0.9 %
10-40 SYRINGE (ML) INJECTION AS NEEDED
Status: DISCONTINUED | OUTPATIENT
Start: 2019-02-21 | End: 2019-02-25 | Stop reason: HOSPADM

## 2019-02-21 RX ORDER — ONDANSETRON 2 MG/ML
4 INJECTION INTRAMUSCULAR; INTRAVENOUS
Status: CANCELLED | OUTPATIENT
Start: 2019-02-21

## 2019-02-21 RX ORDER — SODIUM CHLORIDE 9 MG/ML
1000 INJECTION, SOLUTION INTRAVENOUS CONTINUOUS
Status: DISPENSED | OUTPATIENT
Start: 2019-02-21 | End: 2019-02-22

## 2019-02-21 RX ADMIN — Medication 10 ML: at 15:40

## 2019-02-21 RX ADMIN — SODIUM CHLORIDE 1000 ML: 9 INJECTION, SOLUTION INTRAVENOUS at 13:25

## 2019-02-21 RX ADMIN — FOLIC ACID: 5 INJECTION, SOLUTION INTRAMUSCULAR; INTRAVENOUS; SUBCUTANEOUS at 13:25

## 2019-02-21 RX ADMIN — Medication 10 ML: at 13:25

## 2019-02-21 NOTE — PROGRESS NOTES
[facility] Progress Note Date: 2019 Name: Tr Velasquez MRN: [id number] : 1995 Hydration Mr. Loral Dance arrived to [facility] at [phone] accompanied by his mother. Mr. Loral Dance was assessed and education was provided. Mr. Salome Lowe vitals were reviewed. Visit Vitals /85 (BP 1 Location: Left arm, BP Patient Position: Sitting) Pulse 71 Temp 98.8 °F (37.1 °C) Resp 18 SpO2 98% 22g IV inserted in patient's Left hand left, condition patent and no redness x1 attempt. Positive for blood return and flushes without difficulty. Labs drawn per order for CBC and CMP. Normal saline initiated at at 500cc/hr over 2 hours. 1 liter banana bag initiated over 2 hours concurrently at 500cc/hr. Mr. Loral Dance tolerated infusion without complaints. IV removed. No irritation, bleeding, or hematoma noted at site. Gauze and coban applied to site. Patient armband removed and shredded Mr. Loral Dance was discharged from [place] [address] in stable condition at 1545. He is to follow up with Dr. Chris Topete as scheduled. Ochoa Camp RN 2019  
[id number]

## 2019-02-26 ENCOUNTER — HOSPITAL ENCOUNTER (OUTPATIENT)
Dept: GENERAL RADIOLOGY | Age: 24
Discharge: HOME | End: 2019-02-26
Attending: NURSE PRACTITIONER
Payer: COMMERCIAL

## 2019-02-26 DIAGNOSIS — E66.01 MORBID (SEVERE) OBESITY DUE TO EXCESS CALORIES (HCC): ICD-10-CM

## 2019-02-26 DIAGNOSIS — R53.83 FATIGUE, UNSPECIFIED TYPE: ICD-10-CM

## 2019-02-26 DIAGNOSIS — R11.2 NAUSEA AND VOMITING, INTRACTABILITY OF VOMITING NOT SPECIFIED, UNSPECIFIED VOMITING TYPE: ICD-10-CM

## 2019-02-26 DIAGNOSIS — Z91.119 NONCOMPLIANCE WITH DIETARY REGIMEN REQUIRED STATUS POST BARIATRIC SURGERY: ICD-10-CM

## 2019-02-26 DIAGNOSIS — K91.2 POSTOPERATIVE MALABSORPTION: ICD-10-CM

## 2019-02-26 DIAGNOSIS — Z98.84 NONCOMPLIANCE WITH DIETARY REGIMEN REQUIRED STATUS POST BARIATRIC SURGERY: ICD-10-CM

## 2019-02-26 DIAGNOSIS — E51.9 MANIFESTATIONS OF THIAMINE DEFICIENCY: ICD-10-CM

## 2019-02-26 DIAGNOSIS — Z91.199 NON-COMPLIANCE: ICD-10-CM

## 2019-02-26 PROCEDURE — 74240 X-RAY XM UPR GI TRC 1CNTRST: CPT

## 2019-02-26 PROCEDURE — 74011000255 HC RX REV CODE- 255: Performed by: NURSE PRACTITIONER

## 2019-02-26 PROCEDURE — 74011636320 HC RX REV CODE- 636/320: Performed by: NURSE PRACTITIONER

## 2019-02-26 RX ADMIN — BARIUM SULFATE 176 G: 960 POWDER, FOR SUSPENSION ORAL at 11:00

## 2019-02-26 RX ADMIN — DIATRIZOATE MEGLUMINE AND DIATRIZOATE SODIUM 30 ML: 600; 100 SOLUTION ORAL; RECTAL at 11:00

## 2019-03-13 ENCOUNTER — TELEPHONE (OUTPATIENT)
Dept: SURGERY | Age: 24
End: 2019-03-13

## 2019-03-13 NOTE — TELEPHONE ENCOUNTER
Spoke to pt regarding onset of pain in his left side groin that does not increase with palpation. Pt notes no budge or swelling, redness, swelling at this site. He reports onset of pain this morning when he woke up. I inquired about the N&V which he informs me from the last visit when it was noted that a change in body mechanics while sleeping alleviated this symptom, elevating the head of the bed while he slept. He did he had nausea and dry heaving but no vomitus. Pt states \"I thought it was my back a first\". He informed me that he had not taken any analgesics yet and I did inform to take 2 tylenol to see if this relieves any pain and I would return his call as soon as I had an update. Pt verbalized understanding and the call was ended. Called HV and Kaylah Espinosa, NP was unavailable  And called her cell, which I left a VM. Also sent personal message to call office.

## 2019-03-13 NOTE — TELEPHONE ENCOUNTER
Patient called and stated that he has been having an Intense sharp pain near his private area but not that far down. Pain just started this AM. No constipation. Had a bowel movement 2x today. Patient stated that he has been dry heaving but no vomiting. Pain is towards the left side of his lower abdomen. Stated that he has not eaten anything unusual or outside of his diet. Patient spoke to [facility] in regard to pain.

## 2019-03-27 ENCOUNTER — HOSPITAL ENCOUNTER (OUTPATIENT)
Dept: BARIATRICS/WEIGHT MGMT | Age: 24
Discharge: HOME | End: 2019-03-27

## 2019-03-27 ENCOUNTER — DOCUMENTATION ONLY (OUTPATIENT)
Dept: BARIATRICS/WEIGHT MGMT | Age: 24
End: 2019-03-27

## 2019-03-27 NOTE — PROGRESS NOTES
[address] Loss Adria Fuchs [address] [facility], [address]    Nutrition Follow up Progress Note      Patient's Name: Morales Bergeron   Age: 26 y.o. YOB: 1995   Sex: male    Date:   3/27/2019    Surgery Date: 1/9/19    Height: 5 f 11    Starting Weight: 388  Current Weight:    326          Overall Pounds Lost: 62  BMI: 45.6     Procedure: Gastric Bypass    Follow up to poor eating at 6 week post op eating. Diet History: Patient's is eating around 4 ounces per meal. Patient states he is doing better with eating 3 meals per day. He states if he wakes up at 10 am, he will still get 3 meals in. Patient states he will have a Kayode Brandon's eggwhich (we talked about portions). He states he sleeps with his pillows elevate. Lunch:  Patient states whatever he ate for dinner the night before. This is often chicken thighs, chicken breast, Parmesan chicken with amaro on it. Patient states he is eating some vegetables, i.e. The steamers. Asparagus. Dinner:  Similar to lunch. Patient is using spaghetti squash noodles with sauce or zucchini noodles. I have addressed his portions. Fluid:  64 ounces of water and sugar free Nature made drinks. Diet Cranberry. Patient is not taking protein shakes. He wants to start drinking these again, but is fearful. Vitamins:  Flintstones Complete-2  Patient is not taking calcium. Sublingual B12: 1000 mcg  Vitamin D3: 5000 IU  Patient was educated on B1 dosing. Exercise:  Patient is doing minimal walking. Goals: 1. Continue with hydration intake and current diet foods. 2. Increase activity. Reinforced the diet protocol, proper portions, and sticking to meat and vegetables. Plan: 3 month check up with provider. 9 month follow up with ESPERANZA Baires MS RD

## 2019-04-03 DIAGNOSIS — K91.2 POSTOPERATIVE MALABSORPTION: Primary | ICD-10-CM

## 2019-04-10 ENCOUNTER — OFFICE VISIT (OUTPATIENT)
Dept: SURGERY | Age: 24
End: 2019-04-10

## 2019-04-10 VITALS
SYSTOLIC BLOOD PRESSURE: 122 MMHG | DIASTOLIC BLOOD PRESSURE: 78 MMHG | HEIGHT: 71 IN | BODY MASS INDEX: 44.1 KG/M2 | RESPIRATION RATE: 18 BRPM | WEIGHT: 315 LBS | TEMPERATURE: 98.3 F | HEART RATE: 60 BPM

## 2019-04-10 DIAGNOSIS — K22.4 ESOPHAGEAL DYSMOTILITY: ICD-10-CM

## 2019-04-10 DIAGNOSIS — Z98.84 S/P GASTRIC BYPASS: ICD-10-CM

## 2019-04-10 DIAGNOSIS — R79.89 LOW VITAMIN D LEVEL: ICD-10-CM

## 2019-04-10 DIAGNOSIS — K91.2 POSTOPERATIVE MALABSORPTION: ICD-10-CM

## 2019-04-10 DIAGNOSIS — E66.01 MORBID OBESITY (HCC): Primary | ICD-10-CM

## 2019-04-10 RX ORDER — LANOLIN ALCOHOL/MO/W.PET/CERES
1000 CREAM (GRAM) TOPICAL DAILY
COMMUNITY

## 2019-04-10 NOTE — PROGRESS NOTES
.  Chief Complaint   Patient presents with    Follow-up     gastric bypass 1/9/2019   1. Have you been to the ER, urgent care clinic since your last visit? Hospitalized since your last visit? No    2. Have you seen or consulted any other health care providers outside of the [address] [place] [address] since your last visit? Include any pap smears or colon screening. Yes pcp             Pt ID confirmed    Weight Loss Metrics 4/10/2019 4/10/2019 2/20/2019 2/20/2019 1/24/2019 1/24/2019 1/9/2019   Pre op / Initial Wt 391 - 391 - 388 - -   Today's Wt - 321 lb - 334 lb - 357 lb 12.8 oz -   BMI - 44.77 kg/m2 - 46.58 kg/m2 - 49.9 kg/m2 54.12 kg/m2   Ideal Body Wt 160 - 160 - 160 - -   Excess Body Wt 231 - 231 - 228 - -   Goal Wt 206 - 206 - 206 - -   Wt loss to date 70 - 57 - 30.2 - -   % Wt Loss 0.38 - 0.31 - 0.17 - -   80% .8 - 184.8 - 182.4 - -       Body mass index is 44.77 kg/m².     Post op medications:  MVI: yes 2xday  Calcium Citrate: not taking  Actigal 0  B-12 1000 micrograms  Vit D 3 5000units

## 2019-04-10 NOTE — Clinical Note
Please call check on pt and remind him of UGI and labs pending-- needs to get these done as soon as possible.

## 2019-04-10 NOTE — PROGRESS NOTES
Angelo Rhodes is a 26 y.o. male is now 4 months status post laparoscopic gastric bypass surgery. Doing well overall. Currently on a stage 4 diet without difficulty. Taking in 64 oz water,  60 g protein. Bowel movements are regular. He is compliant with multivitamins, calcium, Vit D and B12 supplements. Pt notes overall improvement since we spoke last on the phone 3/13/19 when he was experiencing abdominal pain, which turned out to be gas pains, after he ate [place] chicken on a stick. He has not since experienced these symptoms. Weight Loss Metrics 4/10/2019 4/10/2019 2/20/2019 2/20/2019 1/24/2019 1/24/2019 1/9/2019   Pre op / Initial Wt 391 - 391 - 388 - -   Today's Wt - 321 lb - 334 lb - 357 lb 12.8 oz -   BMI - 44.77 kg/m2 - 46.58 kg/m2 - 49.9 kg/m2 54.12 kg/m2   Ideal Body Wt 160 - 160 - 160 - -   Excess Body Wt 231 - 231 - 228 - -   Goal Wt 206 - 206 - 206 - -   Wt loss to date 70 - 57 - 30.2 - -   % Wt Loss 0.38 - 0.31 - 0.17 - -   80% .8 - 184.8 - 182.4 - -         Comorbidities:  Hypertension: not applicable  Diabetes: not applicable  Obstructive Sleep Apnea: not applicable  Hyperlipidemia: not applicable  Stress Urinary Incontinence: not applicable  Gastroesophageal Reflux: not applicable  Weight related arthropathy:not applicable        Past Medical History:   Diagnosis Date    Ill-defined condition     Born with a 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  multivitamin with iron (FLINTSTONES) chewable tablet Take 1 Tab by mouth two (2) times a day.  cholecalciferol (VITAMIN D3) 1,000 unit tablet Take 5,000 Units by mouth daily.       calcium citrate-vitamin d2 1,500-200 mg-unit tab Take by mouth.  ondansetron (ZOFRAN ODT) 4 mg disintegrating tablet Take 1 Tab by mouth every eight (8) hours as needed for Nausea. 10 Tab 0       Allergies   Allergen Reactions    Pcn [Penicillins] Hives       ROS:  Review of Systems   Constitutional: Positive for weight loss. Negative for chills and fever. Gastrointestinal: Negative for abdominal pain, constipation, diarrhea, heartburn, nausea and vomiting. Neurological: Negative for dizziness and headaches. All other systems reviewed and are negative. Physicial Exam:  Visit Vitals  /78   Pulse 60   Temp 98.3 °F (36.8 °C)   Resp 18   Ht 5' 11\" (1.803 m)   Wt 145.6 kg (321 lb)   BMI 44.77 kg/m²     Physical Exam   Constitutional: He is oriented to person, place, and time and well-developed, well-nourished, and in no distress. HENT:   Head: Normocephalic. Cardiovascular: Normal rate and normal heart sounds. Pulmonary/Chest: Effort normal and breath sounds normal.   Abdominal: Soft. Bowel sounds are normal. He exhibits no distension. There is no tenderness. Musculoskeletal: Normal range of motion. Neurological: He is alert and oriented to person, place, and time. Skin: Skin is warm and dry. Psychiatric: Affect normal.       Labs: pending     Assessment/Plan: Pt is currently 4 months s/p laparoscopic gastric bypass surgery with a total weight loss of 70 lbs to date. Labs pending, pt to preform asap. Continue MVI/Ca/B12/D3/B1 supplementation, until labs. Per the recommendations of American Society for Metabolic & Bariatric Surgery  (ASMBS) we are recommending all our post operative Bariatric patients add Vit B1 100mg daily to their supplement routine at this time. Stressed importance of hydration with SF clear liquids until urine clear. Continue with food content mainly meats/veggies. Encouraged support group attendance. Advised exercise program of 150m/week.    Repeat UGI to compare with previous findings of Narrowing along the gastroesophageal junction. Reviewed nutritional recommendations, pt communicates understanding. Follow up 2 mo with labs prior and PRN.    Follow up RD 5 mo or sooner, PRN.      >50% of 30 min visit spent counseling   JUANJO Bates-BC

## 2019-04-22 NOTE — PROGRESS NOTES
Contacted patient and verified identity using name and date of birth (2- identifiers)  Pt stated he has his UGI scheduled for tomorrow and said he was going to get labs 2 weeks prior to his appt in July.

## 2019-04-22 NOTE — PROGRESS NOTES
Contacted pt whom stated UGI will be done tomorrow and labs will be done 2 weeks before appt in July.

## 2019-04-23 ENCOUNTER — HOSPITAL ENCOUNTER (OUTPATIENT)
Dept: GENERAL RADIOLOGY | Age: 24
Discharge: HOME | End: 2019-04-23
Attending: NURSE PRACTITIONER
Payer: COMMERCIAL

## 2019-04-23 DIAGNOSIS — K91.2 POSTOPERATIVE MALABSORPTION: ICD-10-CM

## 2019-04-23 DIAGNOSIS — E66.01 MORBID OBESITY (HCC): ICD-10-CM

## 2019-04-23 DIAGNOSIS — K22.4 ESOPHAGEAL DYSMOTILITY: ICD-10-CM

## 2019-04-23 DIAGNOSIS — R79.89 LOW VITAMIN D LEVEL: ICD-10-CM

## 2019-04-23 DIAGNOSIS — Z98.84 S/P GASTRIC BYPASS: ICD-10-CM

## 2019-04-23 PROCEDURE — 74241 XR UPPER GI SERIES W KUB: CPT

## 2019-04-23 PROCEDURE — 74011000255 HC RX REV CODE- 255: Performed by: NURSE PRACTITIONER

## 2019-04-23 RX ADMIN — BARIUM SULFATE 176 G: 960 POWDER, FOR SUSPENSION ORAL at 10:10

## 2019-04-25 DIAGNOSIS — K91.2 POSTOPERATIVE MALABSORPTION: ICD-10-CM

## 2019-04-25 DIAGNOSIS — R79.89 LOW VITAMIN D LEVEL: ICD-10-CM

## 2019-07-08 DIAGNOSIS — R79.89 LOW VITAMIN D LEVEL: ICD-10-CM

## 2019-07-08 DIAGNOSIS — K91.2 POSTOPERATIVE MALABSORPTION: Primary | ICD-10-CM

## 2019-07-11 ENCOUNTER — HOSPITAL ENCOUNTER (OUTPATIENT)
Dept: LAB | Age: 24
Discharge: HOME | End: 2019-07-11
Payer: COMMERCIAL

## 2019-07-11 DIAGNOSIS — R79.89 LOW VITAMIN D LEVEL: ICD-10-CM

## 2019-07-11 DIAGNOSIS — K91.2 POSTOPERATIVE MALABSORPTION: ICD-10-CM

## 2019-07-11 LAB
25(OH)D3 SERPL-MCNC: 28.5 NG/ML (ref 30–100)
ALBUMIN SERPL-MCNC: 4 G/DL (ref 3.4–5)
ANION GAP SERPL CALC-SCNC: 5 MMOL/L (ref 3–18)
BASOPHILS # BLD: 0 K/UL (ref 0–0.1)
BASOPHILS NFR BLD: 1 % (ref 0–2)
BUN SERPL-MCNC: 13 MG/DL (ref 7–18)
BUN/CREAT SERPL: 13 (ref 12–20)
CALCIUM SERPL-MCNC: 9.1 MG/DL (ref 8.5–10.1)
CHLORIDE SERPL-SCNC: 106 MMOL/L (ref 100–108)
CO2 SERPL-SCNC: 30 MMOL/L (ref 21–32)
CREAT SERPL-MCNC: 1 MG/DL (ref 0.6–1.3)
DIFFERENTIAL METHOD BLD: ABNORMAL
EOSINOPHIL # BLD: 0.1 K/UL (ref 0–0.4)
EOSINOPHIL NFR BLD: 2 % (ref 0–5)
ERYTHROCYTE [DISTWIDTH] IN BLOOD BY AUTOMATED COUNT: 12.9 % (ref 11.6–14.5)
FERRITIN SERPL-MCNC: 80 NG/ML (ref 8–388)
FOLATE SERPL-MCNC: 2.8 NG/ML (ref 3.1–17.5)
GLUCOSE SERPL-MCNC: 100 MG/DL (ref 74–99)
HCT VFR BLD AUTO: 46.6 % (ref 36–48)
HGB BLD-MCNC: 15.6 G/DL (ref 13–16)
IRON SERPL-MCNC: 81 UG/DL (ref 50–175)
LYMPHOCYTES # BLD: 1.3 K/UL (ref 0.9–3.6)
LYMPHOCYTES NFR BLD: 17 % (ref 21–52)
MCH RBC QN AUTO: 29.9 PG (ref 24–34)
MCHC RBC AUTO-ENTMCNC: 33.5 G/DL (ref 31–37)
MCV RBC AUTO: 89.4 FL (ref 74–97)
MONOCYTES # BLD: 0.8 K/UL (ref 0.05–1.2)
MONOCYTES NFR BLD: 11 % (ref 3–10)
NEUTS SEG # BLD: 5.5 K/UL (ref 1.8–8)
NEUTS SEG NFR BLD: 69 % (ref 40–73)
PLATELET # BLD AUTO: 335 K/UL (ref 135–420)
PMV BLD AUTO: 10.2 FL (ref 9.2–11.8)
POTASSIUM SERPL-SCNC: 4.2 MMOL/L (ref 3.5–5.5)
RBC # BLD AUTO: 5.21 M/UL (ref 4.7–5.5)
SODIUM SERPL-SCNC: 141 MMOL/L (ref 136–145)
VIT B12 SERPL-MCNC: 463 PG/ML (ref 211–911)
WBC # BLD AUTO: 7.7 K/UL (ref 4.6–13.2)

## 2019-07-11 PROCEDURE — 82306 VITAMIN D 25 HYDROXY: CPT

## 2019-07-11 PROCEDURE — 82607 VITAMIN B-12: CPT

## 2019-07-11 PROCEDURE — 82040 ASSAY OF SERUM ALBUMIN: CPT

## 2019-07-11 PROCEDURE — 82728 ASSAY OF FERRITIN: CPT

## 2019-07-11 PROCEDURE — 85025 COMPLETE CBC W/AUTO DIFF WBC: CPT

## 2019-07-11 PROCEDURE — 80048 BASIC METABOLIC PNL TOTAL CA: CPT

## 2019-07-11 PROCEDURE — 83540 ASSAY OF IRON: CPT

## 2019-07-11 PROCEDURE — 36415 COLL VENOUS BLD VENIPUNCTURE: CPT

## 2019-07-11 PROCEDURE — 84425 ASSAY OF VITAMIN B-1: CPT

## 2019-07-12 NOTE — PROGRESS NOTES
7/12/19:  Patient has been contacted regarding his Vitamin D level. Normal: . Patient's level is 28.5. Reinforced to patient the correct dosing on 5000 IU of Vitamin D 3 per day.     Amelia Alvarado MS RD

## 2019-07-14 LAB — VIT B1 BLD-SCNC: 117.7 NMOL/L (ref 66.5–200)

## 2019-07-15 ENCOUNTER — OFFICE VISIT (OUTPATIENT)
Dept: SURGERY | Age: 24
End: 2019-07-15

## 2019-07-15 VITALS
DIASTOLIC BLOOD PRESSURE: 86 MMHG | SYSTOLIC BLOOD PRESSURE: 126 MMHG | HEIGHT: 71 IN | HEART RATE: 66 BPM | RESPIRATION RATE: 18 BRPM | BODY MASS INDEX: 39.62 KG/M2 | OXYGEN SATURATION: 98 % | WEIGHT: 283 LBS | TEMPERATURE: 98.1 F

## 2019-07-15 DIAGNOSIS — R79.89 LOW VITAMIN D LEVEL: ICD-10-CM

## 2019-07-15 DIAGNOSIS — K22.4 ESOPHAGEAL DYSMOTILITY: ICD-10-CM

## 2019-07-15 DIAGNOSIS — E66.01 MORBID OBESITY (HCC): Primary | ICD-10-CM

## 2019-07-15 DIAGNOSIS — Z98.84 S/P GASTRIC BYPASS: ICD-10-CM

## 2019-07-15 DIAGNOSIS — K91.2 POSTOPERATIVE MALABSORPTION: ICD-10-CM

## 2019-07-15 NOTE — LETTER
7/15/19 Patient: Elisabet Merino YOB: 1995 Date of Visit: 7/15/2019 Ivelisse Farrell PA-C 
[address] [facility] [address] VIA Facsimile: [phone] Dear Ivelisse Farrell PA-C, Thank you for referring Mr. Elisabet Merino to [facility] [place] [address] for evaluation. My notes for this consultation are attached. If you have questions, please do not hesitate to call me. I look forward to following your patient along with you.  
 
 
Sincerely, 
 
Sushant Gooden PA-C

## 2019-07-15 NOTE — PATIENT INSTRUCTIONS
Supplement Resource Guide    Protein Supplement (Recommended up to 6 months post-op)   60-70 Grams of Protein  (during clear liquid phase)   30-50 Grams of Protein  (during soft protein phase and beyond)   0-3 g fat per serving/ 0-3 g sugar per serving   Avoid mixing with milk, fruit, peanut butter, etc.   (Powder should be made with water or Crystal Light)  Calcium Supplement (Lifetime)  Look for: Calcium Citrate (1500 mg per day)   The body cannot absorb more than 500-600 mg at once and needs to be taken in 3 separate dosages. Recommend:   Citracal- 630mg (2 caplets) three times each day   Upcal D- 3 packages or scoops/day. (Each package taken separately)  o www.colonialmedical.com (powdered calcium)   Liquid- 3 Tbsp. per day. (Each Tbsp. taken separately)   Chewable- Must be Calcium citrate  o www.[employer]  o www.[employer]  Vitamin D (Lifetime)                           Look for: Vitamin D3 (Cholecalciferol)   5,000 IU of Vitamin D3 per day   This is in ADDITION to the Vitamin D in your Calcium and Multivitamin    Multi-Vitamin Supplement (Lifetime)  Take 2 vitamins separately each day   Flintstones Complete or a generic chewable complete multivitamin   Bariatric Advantage Multivitamin   Vitamin B1   (Lifetime)   100 mg B1 needed per day (if taking Flintstones Complete or a generic complete chewable).  Additional B1 is NOT needed if taking Bariatric Advantage Multivitamin (Bariatric Advantage has adequate B1 in it). Vitamin B12 (Lifetime)   1000 mcg DAILY of Vitamin B12   All Vitamin B12 must be taken sublingually (under your tongue)    Iron  *Required for menstruating women OR all patients that have a history of low iron*   Recommended to take 500 mg of Vitamin C chewable 30 minutes prior to taking the iron to help with absorption   Avoid taking with calcium supplements.  (Calcium inhibits the absorption of iron)   We recommend going to Bariatric Advantages Website and getting their iron. (www.bariatricadvantage. com) (The lemon-lime has 60mg of iron)    OTC iron is a dosage of 65 mg

## 2019-07-15 NOTE — PROGRESS NOTES
Chief Complaint   Patient presents with    Follow-up     GBP 1/9/2019     Pt ID confirmed    Weight Loss Metrics 7/15/2019 7/15/2019 4/10/2019 4/10/2019 2/20/2019 2/20/2019 1/24/2019   Pre op / Initial Wt 388 - 391 - 391 - 388   Today's Wt - 283 lb - 321 lb - 334 lb -   BMI - 39.47 kg/m2 - 44.77 kg/m2 - 46.58 kg/m2 -   Ideal Body Wt 160 - 160 - 160 - 160   Excess Body Wt 228 - 231 - 231 - 228   Goal Wt 206 - 206 - 206 - 206   Wt loss to date 105 - 70 - 57 - 30.2   % Wt Loss 0.58 - 0.38 - 0.31 - 0.17   80% .4 - 184.8 - 184.8 - 182.4       Body mass index is 39.47 kg/m².     Post op medications:  MVI: flintstones twice daily  Calcium Citrate: none   B-1 none  B-12 1000 micrograms   Vit D 3 5000 units

## 2019-07-15 NOTE — PROGRESS NOTES
Bariatric Follow Up Note    Irene Pennington is a 26 y.o. male is now 7 months status post laparoscopic Dianna-en-Y divided gastric bypass. Doing well overall. Currently on a regular bariatric diet without difficulty. Taking in >64oz fluid,  Not counting g protein. 30min of activity 1-2 days a week. The patient denies hair loss. Bowel movements are regular. The patient is not having any pain. He claims to be compliant with multivitamins, Protein, calcium, Vit D and B12 supplements. The patient reports no difficulty eating/drinking. The patient denies smoking, etOH use, NSAID use and carbonation ingestion. Weight Loss Metrics 7/15/2019 7/15/2019 4/10/2019 4/10/2019 2/20/2019 2/20/2019 1/24/2019   Pre op / Initial Wt 388 - 391 - 391 - 388   Today's Wt - 283 lb - 321 lb - 334 lb -   BMI - 39.47 kg/m2 - 44.77 kg/m2 - 46.58 kg/m2 -   Ideal Body Wt 160 - 160 - 160 - 160   Excess Body Wt 228 - 231 - 231 - 228   Goal Wt 206 - 206 - 206 - 206   Wt loss to date 105 - 70 - 57 - 30.2   % Wt Loss 0.58 - 0.38 - 0.31 - 0.17   80% .4 - 184.8 - 184.8 - 182.4       Body mass index is 39.47 kg/m².         Comorbidities:  Hypertension: not applicable  Diabetes: not applicable  Obstructive Sleep Apnea: not applicable  Hyperlipidemia: not applicable  Stress Urinary Incontinence: not applicable  Gastroesophageal Reflux: not applicable  Weight related arthropathy:not applicable            Past Medical History:   Diagnosis Date    Ill-defined condition     Born with a hole in his lung that healed naturally    Morbid obesity ([facility] [address]     Vitamin D deficiency        Past Surgical History:   Procedure Laterality Date    HX GASTRIC BYPASS  01/09/2019       Current Outpatient Medications   Medication Sig Dispense Refill    cyanocobalamin (VITAMIN B-12) 1,000 mcg tablet Take 1,000 mcg by mouth daily.  multivitamin with iron (FLINTSTONES) chewable tablet Take 1 Tab by mouth two (2) times a day.       cholecalciferol (VITAMIN D3) 1,000 unit tablet Take 5,000 Units by mouth daily. Allergies   Allergen Reactions    Pcn [Penicillins] Hives       ROS:  Review of Systems:  Positives in bold    Constitutional:Unexpected weight gain/loss, night sweats,fatigue/maliase/lethargy, change in sleep, fever, rash  Eyes:  Visual changes, headaches, eye pain, floaters  ENT:  Rhinorrhea, epistaxis, change in hearing, gingival bleeding, sore throat, dysphagia  CV:  Denies chest pain, shortness of breath, difficulty breathing, orthopnea, palpitations, loss of consciousness, claudication  Resp: Cough, wheeze, haemoptysis, sob, exercise intolerence  GI:  Abdominal pain, dysphagia, reflux, bloating, cramping. Obstipation, haematemesis, brbpr, hematochezia,dark tarry stools. Nausea, vomitting, diarrhea, constipation. Neuro: Paresthesias, numbness, weakness, changes in balance, changes in speech, loss of control of bowel or bladder,   Psych: Changes in depression, anxiety, sleep patterns, change in energy Levels    Remainder of ROS as per HPI. Physicial Exam:  Visit Vitals  /86   Pulse 66   Temp 98.1 °F (36.7 °C) (Oral)   Resp 18   Ht 5' 11\" (1.803 m)   Wt 128.4 kg (283 lb)   SpO2 98%   BMI 39.47 kg/m²         General: AAOX3, pleasant and cooperative to exam. Appropriately groomed. NAD. Non-toxic in appearance. Appears stated age. HENT: NC/AT. PERRLA. Extraocular motions are intact. Sclera anicteric, Conjunctiva Clear. Nares clear. Oropharynx pink, moist without exudate or erythema. Uvula Midline. Neck:  Supple, trachea is midline. No JVD, Lymphadenopathy. No bruits. Chest: Good equal bilateral expansion  Lungs: Clear to auscultation bilaterally without e/o crackles, wheezes or rhales. Heart: RRR, S1 and S2 noted. No c/r/m/g/vpmi. Abdomen: obese, soft and non-tender without distension. Good bowel sounds. No vis/palp masses or pulsations. No organo-splenomegaly. No hernias to my exam. No e/o acute abdomen or peritoneal signs. Previous surgical wounds well-healed. Pelvis: Stable. :  Deferred  Rectal: Deferred  Extremities: Positive pulses in all 4 extremities. Baseline range of motion in all 4 extremities. Strength, sensation and reflexes intact, appropriate and equal in b/l upper and lower extremities. No C/C/E  Neuro: CN II-XII grossly intact without focal deficit. Ambulatory. Skin: Clean, warm and dry. Labs: Labs reviewed with patient. Significant for hypovitaminosis D. Proper vitamin D dosing information has been provided with patient voices understanding. Assessment/Plan: Pt is currently 7 months s/p laparoscopic Dianna-en-Y divided gastric bypass with a total weight loss of 105 lbs to date, doing well overall  Stressed importance of hydration with SF clear liquids until urine clear. Increase protein and exercise. OK to continue bariatric diet, with food content mainly meats/veggies. Encouraged support group attendance, recommended dietician visit with food diary. Advised exercise program of 20-30 minutes daily 5-7 times per week. Recommended utilizing bariatric multivitamins or at least adult chewable multivitamins in lieu of flintstones and/or flintstones gummies. Follow up 5 months with labs, sooner as needed. Health Maintenance issues reviewed and except as it relates to bariatrics, deferred to primary care. Greater than 50% of this 30 minute visit was spent couseling the patient about the aforementioned issues.          India Garcia MS, PA-C

## 2020-12-02 ENCOUNTER — DOCUMENTATION ONLY (OUTPATIENT)
Dept: SURGERY | Age: 25
End: 2020-12-02

## 2020-12-02 NOTE — PROGRESS NOTES
Per [facility] requirements;  E-mail and letter sent for follow up appointment. [facility]  Brina Reyes. [address] [facility] Weight [facility] [place]   [facility]  [facility]        Dear Patient,        Your health is our main concern. It is important for your health to have follow-up lab work and to see your surgeon at 3 months, 6 months and annually after your weight loss surgery. Additionally, the Department of Bariatric Surgery at our hospital is a member of the [employer] of [address] [place] [address] Surgical Quality Improvement Program ([facility] NSQIP). As a participant in this program, we gather information on the outcomes of our patients after surgery. Please call the office for a follow up appointment at [phone]. If you have moved out of the area or have changed surgeons please call us and let us know the name of your doctor. Your health and feedback are important to us. We greatly appreciate your response.        Thank you,  [facility] [address] [facility]

## (undated) DEVICE — RELOAD STPL L60MM H1.5-3.6MM REG TISS BLU GRIPPING SURF B

## (undated) DEVICE — TROCAR RMFG ENDOPATH 12X100M --

## (undated) DEVICE — UNIVERSAL FIXATION CANNULA: Brand: VERSAONE

## (undated) DEVICE — TRUE CONTENT TO BE POPULATED AS PART OF REBRANDING: Brand: ARGYLE

## (undated) DEVICE — STAPLE INT WHT BLU G GRN BLK REINF FOR ENDOPATH ECHELON FLX

## (undated) DEVICE — PREP SKN CHLORHEX GLUC 8OZ --

## (undated) DEVICE — DISPOSABLE SUCTION/IRRIGATOR TUBE SET WITH TIP: Brand: AHTO

## (undated) DEVICE — SOL IRR STRL H2O 500ML STRL --

## (undated) DEVICE — SHEARS: Brand: ENDO SHEARS

## (undated) DEVICE — BAG SPEC RETRV 275ML 10ML DISPOSABLE RELIACATCH

## (undated) DEVICE — LIGHT HANDLE: Brand: DEVON

## (undated) DEVICE — REM POLYHESIVE ADULT PATIENT RETURN ELECTRODE: Brand: VALLEYLAB

## (undated) DEVICE — GLOVE SURG SZ 7.5 L11.73IN FNGR THK7.5MIL STRW LTX POLYMER

## (undated) DEVICE — BLADELESS OPTICAL TROCAR WITH FIXATION CANNULA: Brand: VERSAPORT

## (undated) DEVICE — STAPLER SKIN L440MM 32MM LNG 12 FIRING B FRM PWR + GRIPPING

## (undated) DEVICE — KIT CATH OD16FR 5ML BLLN SIL URIN INDWL STR TIP INF CTRL

## (undated) DEVICE — SUTURE MCRYL SZ 4-0 L27IN ABSRB UD L24MM PS-1 3/8 CIR PRIM Y935H

## (undated) DEVICE — VISUALIZATION SYSTEM: Brand: CLEARIFY

## (undated) DEVICE — APPLICATOR BNDG 1MM ADH PREMIERPRO EXOFIN

## (undated) DEVICE — RELOAD STPL L60MM H1-2.6MM MESENTERY THN TISS WHT 6 ROW

## (undated) DEVICE — SUTURE VCRL SZ 2-0 L54IN ABSRB VLT W/O NDL POLYGLACTIN 910 J618H

## (undated) DEVICE — SHEAR HARMONIC ACET 5MMX36CM -- ACE PLUS

## (undated) DEVICE — INTENDED FOR TISSUE SEPARATION, AND OTHER PROCEDURES THAT REQUIRE A SHARP SURGICAL BLADE TO PUNCTURE OR CUT.: Brand: BARD-PARKER ® CARBON RIB-BACK BLADES

## (undated) DEVICE — SUTURE VCRL SZ 3-0 L27IN ABSRB VLT L26MM SH 1/2 CIR J316H

## (undated) DEVICE — PACK PROCEDURE SURG LAPAROSCOPY 17X7 MM BRTRC PRIMUS

## (undated) DEVICE — TROCAR RMFG ENDOSCP BLDLS 12MM -- LAWSON OEM ITEM 162085

## (undated) DEVICE — SUT SLK 2-0SH 30IN BLK --

## (undated) DEVICE — KENDALL SCD EXPRESS SLEEVES, KNEE LENGTH, MEDIUM: Brand: KENDALL SCD